# Patient Record
Sex: MALE | Race: WHITE | NOT HISPANIC OR LATINO | Employment: FULL TIME | URBAN - NONMETROPOLITAN AREA
[De-identification: names, ages, dates, MRNs, and addresses within clinical notes are randomized per-mention and may not be internally consistent; named-entity substitution may affect disease eponyms.]

---

## 2017-01-16 ENCOUNTER — INFUSION (OUTPATIENT)
Dept: ONCOLOGY | Facility: CLINIC | Age: 42
End: 2017-01-16

## 2017-01-16 DIAGNOSIS — C62.11 MALIGNANT NEOPLASM OF DESCENDED RIGHT TESTIS (HCC): Primary | ICD-10-CM

## 2017-01-16 RX ORDER — SODIUM CHLORIDE 0.9 % (FLUSH) 0.9 %
10 SYRINGE (ML) INJECTION AS NEEDED
Status: CANCELLED | OUTPATIENT
Start: 2017-01-16

## 2017-01-16 RX ORDER — SODIUM CHLORIDE 0.9 % (FLUSH) 0.9 %
10 SYRINGE (ML) INJECTION AS NEEDED
Status: DISCONTINUED | OUTPATIENT
Start: 2017-01-16 | End: 2017-01-16 | Stop reason: HOSPADM

## 2017-01-16 RX ADMIN — Medication 10 ML: at 09:18

## 2017-02-02 DIAGNOSIS — C62.91 MALIGNANT NEOPLASM OF RIGHT TESTIS, UNSPECIFIED WHETHER DESCENDED OR UNDESCENDED (HCC): Primary | ICD-10-CM

## 2017-02-03 ENCOUNTER — LAB (OUTPATIENT)
Dept: ONCOLOGY | Facility: CLINIC | Age: 42
End: 2017-02-03

## 2017-02-03 ENCOUNTER — OFFICE VISIT (OUTPATIENT)
Dept: ONCOLOGY | Facility: CLINIC | Age: 42
End: 2017-02-03

## 2017-02-03 VITALS
DIASTOLIC BLOOD PRESSURE: 84 MMHG | TEMPERATURE: 97.7 F | OXYGEN SATURATION: 96 % | SYSTOLIC BLOOD PRESSURE: 120 MMHG | HEIGHT: 68 IN | WEIGHT: 222.6 LBS | RESPIRATION RATE: 16 BRPM | BODY MASS INDEX: 33.74 KG/M2 | HEART RATE: 72 BPM

## 2017-02-03 DIAGNOSIS — C62.91 MALIGNANT NEOPLASM OF RIGHT TESTIS, UNSPECIFIED WHETHER DESCENDED OR UNDESCENDED (HCC): ICD-10-CM

## 2017-02-03 DIAGNOSIS — C62.11 MALIGNANT NEOPLASM OF DESCENDED RIGHT TESTIS (HCC): Primary | ICD-10-CM

## 2017-02-03 DIAGNOSIS — C77.2 SECONDARY AND UNSPECIFIED MALIGNANT NEOPLASM OF INTRA-ABDOMINAL LYMPH NODES (HCC): ICD-10-CM

## 2017-02-03 PROBLEM — D48.9 TERATOMA: Status: ACTIVE | Noted: 2017-02-03

## 2017-02-03 PROCEDURE — 99213 OFFICE O/P EST LOW 20 MIN: CPT | Performed by: INTERNAL MEDICINE

## 2017-02-03 RX ORDER — CARISOPRODOL 350 MG/1
TABLET ORAL
COMMUNITY
Start: 2017-02-02 | End: 2020-12-07

## 2017-02-03 RX ORDER — PITAVASTATIN CALCIUM 4.18 MG/1
1 TABLET, FILM COATED ORAL DAILY
COMMUNITY
Start: 2017-01-24

## 2017-02-03 RX ORDER — AZELASTINE HYDROCHLORIDE AND FLUTICASONE PROPIONATE 137; 50 UG/1; UG/1
SPRAY, METERED NASAL
COMMUNITY
Start: 2017-01-24 | End: 2020-07-07

## 2017-02-03 NOTE — PROGRESS NOTES
[facility]  HEMATOLOGY & ONCOLOGY        Subjective  is doing very well and is asymptomatic.  His markers including LDH beta-hCG and alpha-fetoprotein were all normal in October.  His scans last year were also normal but we will repeat those this year.  He keeps track of his condition particularly since he was so unusual and having an abdominal teratoma form, while chemotherapy was being administered.  He is a pharmaceutical salesman and doing well.  I have seen him in the past also.    VISIT DIAGNOSIS: Testicular carcinoma                                       Abdominal teratoma-all in remission    Encounter Diagnosis   Name Primary?   • Malignant neoplasm of descended right testis Yes       REASON FOR VISIT:   No chief complaint on file.       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 Tumor was a maximum of 3.7 cm in  diameter, no lymphovascular invasion is noted. The margins were clear and spermatic cord was unrevealing. The patient has undergonea  CT scan of the chest which is normal. A CT abdomen and pelvis which is reported to show a 3 cm left hepatic dome compatible with a  hemangioma, but ultrasound correlation recommended. . Preoperative laboratory studies revealed a normal CBC, a chemistry profilewithin  normal range. I do not see a LDH performed. His beta HCG was elevated at 8 with a normal being 03. His alpha fetoprotein [id number] with  normal being 0 to 8.Patient underwent a retroperitoneal lymph node dissection on August 16, 2011. Results of this surgery showed  negative nodes. Stage I  disease. On 10/10/2011 his alphafetoproteinwas  3.6 and beta hCG was undetectable. His LDH was normal.On 12/16/2011 his alphafetoproteinwas  3.5 and a beta  hCG was 194. His beta hCG was repeated in our office was 220. Outpatient CAT  scans were performed on 12/30/2011. The chest has been unremarkable. CT scan of the abdomen and pelvis revealed small lymphnodes  in the retroperitoneum and a 2.5 cm mass effect. Question of seroma versus lymph node involvement was raised. In this setting arising  markers within 4 months of his retroperitoneal lymph node dissection and the fact that these markers were negative and 2 monthsafter his  dissection is consistent with recurrence of his disease and the need for chemotherapy treatment.  The patient received 3 cycles of BEP therapy from 1/16/2012 to 3/12/2012. A repeat CAT scan of his abdomen and pelvis on  3/19/2014post his 3 cycles of chemotherapy showed a mass lesion in his retroperitoneum. He return to [facility]  and underwent a retroperitoneal dissection of this mass by Dr. Paniagua, removing a benign teratoma on May 3, 2012. He is  undergoingsurveillance for recurrence at this time.        Malignant neoplasm of right testis    10/18/2016 Initial Diagnosis    Malignant neoplasm of right testis       Cancer Staging Information:  No matching staging information was found for the patient.      INTERVAL HISTORY  Patient ID: Alex Vann is a 41 y.o. year old male is here today for follow-up.      Past Medical History:   Past Medical History   Diagnosis Date   • Hyperlipidemia    • Malignant neoplasm of right testis 10/18/2016   • Osteoarthritis    • Testicular mass      Past Surgical History:   Past Surgical History   Procedure Laterality Date   • Vasectomy     • Testicle surgery       Granuloma of the right testicle   • Knee arthroscopy       Social History:   Social History     Social History   • Marital status:      Spouse name: N/A   • Number of children: N/A   • Years of education: N/A     Occupational History   • Not on file.     Social History Main Topics   • Smoking status: Former  "Smoker   • Smokeless tobacco: Not on file   • Alcohol use Yes   • Drug use: Not on file   • Sexual activity: Not on file     Other Topics Concern   • Not on file     Social History Narrative     Family History:   Family History   Problem Relation Age of Onset   • Mental illness Paternal Uncle        Review of Systems   Allergic/Immunologic: Positive for environmental allergies.   All other systems reviewed and are negative.       Performance Status:  Asymptomatic    Medications:    Current Outpatient Prescriptions   Medication Sig Dispense Refill   • carisoprodol (SOMA) 350 MG tablet      • DYMISTA 137-50 MCG/ACT suspension      • ezetimibe (ZETIA) 10 MG tablet Take 10 mg by mouth Daily.     • levocetirizine (XYZAL) 5 MG tablet Take 5 mg by mouth.     • LIVALO 4 MG tablet      • meloxicam (MOBIC) 15 MG tablet Take 15 mg by mouth Daily.     • montelukast (SINGULAIR) 4 MG chewable tablet Chew 4 mg.     • pitavastatin calcium (LIVALO) 2 MG tablet tablet Take 2 mg by mouth.     • zolpidem CR (AMBIEN CR) 12.5 MG CR tablet Take 12.5 mg by mouth.       No current facility-administered medications for this visit.        ALLERGIES:    Allergies   Allergen Reactions   • Sulfa Antibiotics        Objective      Vitals:    02/03/17 0821   BP: 120/84   Pulse: 72   Resp: 16   Temp: 97.7 °F (36.5 °C)   TempSrc: Tympanic   SpO2: 96%   Weight: 222 lb 9.6 oz (101 kg)   Height: 68\" (172.7 cm)     Visit Vitals   • /84   • Pulse 72   • Temp 97.7 °F (36.5 °C) (Tympanic)   • Resp 16   • Ht 68\" (172.7 cm)   • Wt 222 lb 9.6 oz (101 kg)   • SpO2 96%   • BMI 33.85 kg/m2       Current Status 2/3/2017   ECOG score 0         General Appearance:    Alert, cooperative, no distress, appears stated age   Head:    Normocephalic, without obvious abnormality, atraumatic   Eyes:    PERRL, conjunctiva/corneas clear, EOM's intact, fundi     benign, both eyes   Ears:    Normal TM's and external ear canals, both ears   Nose:   Nares normal, septum " midline, mucosa normal, no drainage     or sinus tenderness   Throat:   Lips, mucosa, and tongue normal; teeth and gums normal   Neck:   Supple, symmetrical, trachea midline, no adenopathy;     thyroid:  no enlargement/tenderness/nodules; no carotid    bruit or JVD   Back:     Symmetric, no curvature, ROM normal, no CVA tenderness   Lungs:     Clear to auscultation bilaterally, respirations unlabored   Chest Wall:    No tenderness or deformity    Heart:    Regular rate and rhythm, S1 and S2 normal, no murmur, rub    or gallop   Abdomen:     Soft, non-tender, bowel sounds active all four quadrants,     no masses, no organomegaly; midline scars is well healed    Extremities:   Extremities normal, atraumatic, no cyanosis or edema   Pulses:   2+ and symmetric all extremities   Skin:   Skin color, texture, turgor normal, no rashes or lesions   Lymph nodes:   Cervical, supraclavicular, and axillary nodes normal   Neurologic:   CNII-XII intact, normal strength, sensation and reflexes     throughout       RECENT LABS:    No results found for: AMPHETSCREEN, BARBITSCNUR, LABBENZSCN, COCAINEUR, OSMOLALITY, PCPUR, THCSCNINP, LABMETHSCN     Results for orders placed or performed in visit on 10/18/16   Comprehensive Metabolic Panel   Result Value Ref Range    Glucose 93 75 - 110 mg/dL    BUN 17 9 - 21 mg/dL    Creatinine 1.10 0.80 - 1.50 mg/dL    Sodium 143 137 - 145 mmol/L    Potassium 4.4 3.6 - 5.0 mmol/L    Chloride 106 98 - 107 mmol/L    CO2 26.0 22.0 - 30.0 mmol/L    Calcium 9.9 8.4 - 10.2 mg/dL    Total Protein 7.8 6.3 - 8.2 g/dL    Albumin 4.60 3.50 - 5.00 g/dL    ALT (SGPT) 47 21 - 72 U/L    AST (SGOT) 28 5 - 40 U/L    Alkaline Phosphatase 65 38 - 126 U/L    Total Bilirubin 0.6 0.2 - 1.3 mg/dL    eGFR Non African Amer 73 >60 mL/min/1.73    Globulin 3.2 gm/dL    A/G Ratio 1.4 g/dL    BUN/Creatinine Ratio 15.5 7.0 - 25.0    Anion Gap 11.0 mmol/L   Lactate Dehydrogenase   Result Value Ref Range     313 - 618 U/L   CBC  Auto Differential   Result Value Ref Range    WBC 4.70 (L) 4.80 - 10.80 10*3/mm3    RBC 5.12 4.70 - 6.10 10*6/mm3    Hemoglobin 15.5 14.0 - 18.0 g/dL    Hematocrit 48.6 42.0 - 52.0 %    MCV 95.0 (H) 80.0 - 94.0 fL    MCH 30.3 27.0 - 31.0 pg    MCHC 31.9 (L) 33.0 - 37.0 g/dL    RDW 13.9 11.5 - 14.5 %    MPV 8.8 6.0 - 12.0 fL    Platelets 217 130 - 400 10*3/mm3    Neutrophil % 59.7 37.0 - 92.0 %    Lymphocyte % 30.9 10.0 - 58.5 %    Auto Mixed Cells % 9.4 0.2 - 15.1 %    Neutrophils, Absolute 2.80 2.00 - 7.80 10*3/mm3    Lymphocytes, Absolute 1.50 0.80 - 7.00 10*3/mm3    Auto Mixed Cells # 0.40 0.10 - 1.50 10*3/uL           RADIOLOGY:  No results found.      Assessment/Plan     Patient Active Problem List   Diagnosis   • Malignant neoplasm of right testis        Diagnoses and all orders for this visit:    Malignant neoplasm of descended right testis       In remission and doing well-might benefit from vitamin B6,25 mg po daily for a few months as an empiric effort to Rx the mild neuropathy he demonstrates.          You Lora MD    2/3/2017    9:22 AM

## 2017-03-13 ENCOUNTER — INFUSION (OUTPATIENT)
Dept: ONCOLOGY | Facility: CLINIC | Age: 42
End: 2017-03-13

## 2017-03-13 DIAGNOSIS — C62.11 MALIGNANT NEOPLASM OF DESCENDED RIGHT TESTIS (HCC): Primary | ICD-10-CM

## 2017-03-13 RX ORDER — SODIUM CHLORIDE 0.9 % (FLUSH) 0.9 %
10 SYRINGE (ML) INJECTION AS NEEDED
Status: DISCONTINUED | OUTPATIENT
Start: 2017-03-13 | End: 2017-03-13 | Stop reason: HOSPADM

## 2017-03-13 RX ORDER — SODIUM CHLORIDE 0.9 % (FLUSH) 0.9 %
10 SYRINGE (ML) INJECTION AS NEEDED
Status: CANCELLED | OUTPATIENT
Start: 2017-03-13

## 2017-03-13 RX ADMIN — Medication 10 ML: at 08:24

## 2017-04-18 ENCOUNTER — LAB (OUTPATIENT)
Dept: ONCOLOGY | Facility: CLINIC | Age: 42
End: 2017-04-18

## 2017-04-18 ENCOUNTER — HOSPITAL ENCOUNTER (OUTPATIENT)
Dept: CT IMAGING | Facility: HOSPITAL | Age: 42
Discharge: HOME | End: 2017-04-18
Attending: INTERNAL MEDICINE | Admitting: INTERNAL MEDICINE

## 2017-04-18 ENCOUNTER — HOSPITAL ENCOUNTER (OUTPATIENT)
Dept: GENERAL RADIOLOGY | Facility: HOSPITAL | Age: 42
Discharge: HOME | End: 2017-04-18
Attending: INTERNAL MEDICINE

## 2017-04-18 DIAGNOSIS — C62.11 MALIGNANT NEOPLASM OF DESCENDED RIGHT TESTIS (HCC): ICD-10-CM

## 2017-04-18 DIAGNOSIS — C77.2 SECONDARY AND UNSPECIFIED MALIGNANT NEOPLASM OF INTRA-ABDOMINAL LYMPH NODES (HCC): ICD-10-CM

## 2017-04-18 DIAGNOSIS — C62.91 MALIGNANT NEOPLASM OF RIGHT TESTIS, UNSPECIFIED WHETHER DESCENDED OR UNDESCENDED (HCC): Primary | ICD-10-CM

## 2017-04-18 DIAGNOSIS — C62.91 MALIGNANT NEOPLASM OF RIGHT TESTIS, UNSPECIFIED WHETHER DESCENDED OR UNDESCENDED (HCC): ICD-10-CM

## 2017-04-18 LAB
ALBUMIN SERPL-MCNC: 4.7 G/DL (ref 3.5–5)
ALBUMIN/GLOB SERPL: 1.7 G/DL
ALP SERPL-CCNC: 50 U/L (ref 38–126)
ALT SERPL W P-5'-P-CCNC: 52 U/L (ref 21–72)
ANION GAP SERPL CALCULATED.3IONS-SCNC: 9 MMOL/L
AST SERPL-CCNC: 34 U/L (ref 5–40)
AUTO MIXED CELLS #: 0.5 10*3/UL (ref 0.1–1.5)
AUTO MIXED CELLS %: 11.2 % (ref 0.2–15.1)
BILIRUB SERPL-MCNC: 0.7 MG/DL (ref 0.2–1.3)
BUN BLD-MCNC: 13 MG/DL (ref 9–21)
BUN/CREAT SERPL: 14.4 (ref 7–25)
CALCIUM SPEC-SCNC: 9.7 MG/DL (ref 8.4–10.2)
CHLORIDE SERPL-SCNC: 105 MMOL/L (ref 98–107)
CO2 SERPL-SCNC: 27 MMOL/L (ref 22–30)
CREAT BLD-MCNC: 0.9 MG/DL (ref 0.8–1.5)
CREAT BLDA-MCNC: 1 MG/DL (ref 0.6–1.3)
ERYTHROCYTE [DISTWIDTH] IN BLOOD BY AUTOMATED COUNT: 13.4 % (ref 11.5–14.5)
GFR SERPL CREATININE-BSD FRML MDRD: 93 ML/MIN/1.73
GLOBULIN UR ELPH-MCNC: 2.8 GM/DL
GLUCOSE BLD-MCNC: 87 MG/DL (ref 75–110)
HCT VFR BLD AUTO: 47.5 % (ref 42–52)
HGB BLD-MCNC: 15.6 G/DL (ref 14–18)
LDH SERPL-CCNC: 385 U/L (ref 313–618)
LYMPHOCYTES # BLD AUTO: 1.3 10*3/MM3 (ref 0.8–7)
LYMPHOCYTES NFR BLD AUTO: 32 % (ref 10–58.5)
MCH RBC QN AUTO: 31.2 PG (ref 27–31)
MCHC RBC AUTO-ENTMCNC: 32.8 G/DL (ref 33–37)
MCV RBC AUTO: 95 FL (ref 80–94)
NEUTROPHILS # BLD AUTO: 2.4 10*3/MM3 (ref 2–7.8)
NEUTROPHILS NFR BLD AUTO: 56.8 % (ref 37–92)
PLATELET # BLD AUTO: 244 10*3/MM3 (ref 130–400)
PMV BLD AUTO: 8.6 FL (ref 6–12)
POTASSIUM BLD-SCNC: 4.1 MMOL/L (ref 3.6–5)
PROT SERPL-MCNC: 7.5 G/DL (ref 6.3–8.2)
RBC # BLD AUTO: 5 10*6/MM3 (ref 4.7–6.1)
SODIUM BLD-SCNC: 141 MMOL/L (ref 137–145)
WBC NRBC COR # BLD: 4.2 10*3/MM3 (ref 4.8–10.8)

## 2017-04-18 PROCEDURE — 82565 ASSAY OF CREATININE: CPT

## 2017-04-18 PROCEDURE — 36415 COLL VENOUS BLD VENIPUNCTURE: CPT | Performed by: INTERNAL MEDICINE

## 2017-04-18 PROCEDURE — 83615 LACTATE (LD) (LDH) ENZYME: CPT | Performed by: INTERNAL MEDICINE

## 2017-04-18 PROCEDURE — 71260 CT THORAX DX C+: CPT

## 2017-04-18 PROCEDURE — 74177 CT ABD & PELVIS W/CONTRAST: CPT

## 2017-04-18 PROCEDURE — 85025 COMPLETE CBC W/AUTO DIFF WBC: CPT | Performed by: INTERNAL MEDICINE

## 2017-04-18 PROCEDURE — 80053 COMPREHEN METABOLIC PANEL: CPT | Performed by: INTERNAL MEDICINE

## 2017-04-18 PROCEDURE — 0 IOPAMIDOL PER 1 ML: Performed by: INTERNAL MEDICINE

## 2017-04-18 PROCEDURE — 71020 HC CHEST PA AND LATERAL: CPT

## 2017-04-18 RX ADMIN — IOPAMIDOL 150 ML: 755 INJECTION, SOLUTION INTRAVENOUS at 09:30

## 2017-04-19 LAB
AFP-TM SERPL-MCNC: 2.6 NG/ML (ref 0–8.3)
HCG INTACT+B SERPL-ACNC: <2.39 MIU/ML (ref 0–10)

## 2017-04-21 ENCOUNTER — OFFICE VISIT (OUTPATIENT)
Dept: ONCOLOGY | Facility: CLINIC | Age: 42
End: 2017-04-21

## 2017-04-21 VITALS
DIASTOLIC BLOOD PRESSURE: 84 MMHG | HEART RATE: 72 BPM | RESPIRATION RATE: 16 BRPM | TEMPERATURE: 97.9 F | OXYGEN SATURATION: 97 % | SYSTOLIC BLOOD PRESSURE: 138 MMHG | HEIGHT: 68 IN | WEIGHT: 220.8 LBS | BODY MASS INDEX: 33.46 KG/M2

## 2017-04-21 DIAGNOSIS — C62.11 MALIGNANT NEOPLASM OF DESCENDED RIGHT TESTIS (HCC): Primary | ICD-10-CM

## 2017-04-21 PROCEDURE — 99214 OFFICE O/P EST MOD 30 MIN: CPT | Performed by: INTERNAL MEDICINE

## 2017-04-21 NOTE — PROGRESS NOTES
[facility]  HEMATOLOGY & ONCOLOGY        Subjective     VISIT DIAGNOSIS: No diagnosis found.    REASON FOR VISIT:   No chief complaint on file.       HEMATOLOGY / ONCOLOGY HISTORY:   Oncology/Hematology History    Mr. Vann is a 39 year old male who initially sought urologic attention because of a vasectomy. The patient underwent surgery  on7/22/2008 without complications. He was seen again because of a scrotal mass on 8/14/2008. He was examined by Dr. Guadalupe at  thattime and was felt to have a sperm granuloma status post vasectomy and this was not felt to be of any consequence. This was a mass  1cm nodule above his right testicle.The patient returned to see Dr. Mack June 21, 2011, with the finding of a right scrotal mass sudden in  onset. The mass was identified approximately two weeks prior to visit. It had been increasing in size with no associated pain. The mass  was identified during a testicularself exam. Dr. Mack examined the right testicle revealing a mildly tender, firm, marble sized mass lower  aspect of the testicle.  Ultrasound examination was performed finding a 2.8 cm heterogeneous mass in the right testicle. There were some internal cysticchanges  consistent with necrosis. This was worrisome for testicular carcinoma. There is diffuse microlithiasis involving the right testes.The left  testes shows diffuse microlithiasis but less severe on the right. No lesion was appreciated. There was an epidermal cystinvolving the right  testes measuring 5 mm and has a benign appearance and a varicocele on the left testes noted as well.  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 Tumor was a maximum of 3.7 cm in  diameter, no lymphovascular invasion is noted. The margins were clear and spermatic cord was  unrevealing. The patient has undergonea  CT scan of the chest which is normal. A CT abdomen and pelvis which is reported to show a 3 cm left hepatic dome compatible with a  hemangioma, but ultrasound correlation recommended. . Preoperative laboratory studies revealed a normal CBC, a chemistry profilewithin  normal range. I do not see a LDH performed. His beta HCG was elevated at 8 with a normal being 03. His alpha fetoprotein [id number] with  normal being 0 to 8.Patient underwent a retroperitoneal lymph node dissection on August 16, 2011. Results of this surgery showed  negative nodes. Stage I  disease. On 10/10/2011 his alphafetoproteinwas  3.6 and beta hCG was undetectable. His LDH was normal.On 12/16/2011 his alphafetoproteinwas  3.5 and a beta hCG was 194. His beta hCG was repeated in our office was 220. Outpatient CAT  scans were performed on 12/30/2011. The chest has been unremarkable. CT scan of the abdomen and pelvis revealed small lymphnodes  in the retroperitoneum and a 2.5 cm mass effect. Question of seroma versus lymph node involvement was raised. In this setting arising  markers within 4 months of his retroperitoneal lymph node dissection and the fact that these markers were negative and 2 monthsafter his  dissection is consistent with recurrence of his disease and the need for chemotherapy treatment.  The patient received 3 cycles of BEP therapy from 1/16/2012 to 3/12/2012. A repeat CAT scan of his abdomen and pelvis on  3/19/2014post his 3 cycles of chemotherapy showed a mass lesion in his retroperitoneum. He return to [facility]  and underwent a retroperitoneal dissection of this mass by Dr. Paniagua, removing a benign teratoma on May 3, 2012. He is  undergoingsurveillance for recurrence at this time.          Malignant neoplasm of right testis    10/18/2016 Initial Diagnosis    Malignant neoplasm of right testis     [No treatment plan]  Cancer Staging Information:  No matching  staging information was found for the patient.      INTERVAL HISTORY  Patient ID: Alex Vann is a 41 y.o. year old male         Review of Systems         Medications:    Current Outpatient Prescriptions   Medication Sig Dispense Refill   • carisoprodol (SOMA) 350 MG tablet      • DYMISTA 137-50 MCG/ACT suspension      • ezetimibe (ZETIA) 10 MG tablet Take 10 mg by mouth Daily.     • levocetirizine (XYZAL) 5 MG tablet Take 5 mg by mouth.     • LIVALO 4 MG tablet      • meloxicam (MOBIC) 15 MG tablet Take 15 mg by mouth Daily.     • montelukast (SINGULAIR) 4 MG chewable tablet Chew 4 mg.     • zolpidem CR (AMBIEN CR) 12.5 MG CR tablet Take 12.5 mg by mouth.       No current facility-administered medications for this visit.        ALLERGIES:    Allergies   Allergen Reactions   • Sulfa Antibiotics        Objective      @VITALS    Current Status 2/3/2017   ECOG score 0       General Appearance: Patient is awake, alert, oriented and in no acute distress. Patient is welldeveloped, wellnourished, and appears stated age.  HEENT: Normocephalic. Sclerae clear, conjunctiva pink, extraocular movements intact, pupils, round, reactive to light and  accommodation. Mouth and throat are clear with moist oral mucosa.  NECK: Supple, no jugular venous distention, thyroid not enlarged.  LYMPH: No cervical, supraclavicular, axillary, or inguinal lymphadenopathy.  CHEST: Equal bilateral expansion, AP  diameter normal, resonant percussion note  LUNGS: Good air movement, no rales, rhonchi, rubs or wheezes with auscultation  CARDIO: Regular sinus rhythm, no murmurs, gallops or rubs.  ABDOMEN: Nondistended, soft, No tenderness, no guarding, no rebound, No hepatosplenomegaly. No abdominal masses. Bowel sounds positive. No hernia  GENITALIA: Not examined.  BREASTS: Not examined.  MUSKEL: No joint swelling, decreased motion, or inflammation  EXTREMS: No edema, clubbing, cyanosis, No varicose veins.  NEURO: Grossly nonfocal, Gait is  coordinated and smooth, Cognition is preserved.  SKIN: No rashes, no ecchymoses, no petechia.  PSYCH: Oriented to time, place and person. Memory is preserved. Mood and affect appear normal      RECENT LABS:  Lab on 04/18/2017   Component Date Value Ref Range Status   • HCG Quantitative 04/18/2017 <2.39  0.00 - 10.00 mIU/mL Final   • AFP Tumor Marker 04/18/2017 2.6  0.0 - 8.3 ng/mL Final    Roche ECLIA methodology   Orders Only on 04/18/2017   Component Date Value Ref Range Status   • Glucose 04/18/2017 87  75 - 110 mg/dL Final   • BUN 04/18/2017 13  9 - 21 mg/dL Final   • Creatinine 04/18/2017 0.90  0.80 - 1.50 mg/dL Final   • Sodium 04/18/2017 141  137 - 145 mmol/L Final   • Potassium 04/18/2017 4.1  3.6 - 5.0 mmol/L Final   • Chloride 04/18/2017 105  98 - 107 mmol/L Final   • CO2 04/18/2017 27.0  22.0 - 30.0 mmol/L Final   • Calcium 04/18/2017 9.7  8.4 - 10.2 mg/dL Final   • Total Protein 04/18/2017 7.5  6.3 - 8.2 g/dL Final   • Albumin 04/18/2017 4.70  3.50 - 5.00 g/dL Final   • ALT (SGPT) 04/18/2017 52  21 - 72 U/L Final   • AST (SGOT) 04/18/2017 34  5 - 40 U/L Final   • Alkaline Phosphatase 04/18/2017 50  38 - 126 U/L Final   • Total Bilirubin 04/18/2017 0.7  0.2 - 1.3 mg/dL Final   • eGFR Non African Amer 04/18/2017 93  >60 mL/min/1.73 Final   • Globulin 04/18/2017 2.8  gm/dL Final   • A/G Ratio 04/18/2017 1.7  g/dL Final   • BUN/Creatinine Ratio 04/18/2017 14.4  7.0 - 25.0 Final   • Anion Gap 04/18/2017 9.0  mmol/L Final   • LDH 04/18/2017 385  313 - 618 U/L Final   • WBC 04/18/2017 4.20* 4.80 - 10.80 10*3/mm3 Final   • RBC 04/18/2017 5.00  4.70 - 6.10 10*6/mm3 Final   • Hemoglobin 04/18/2017 15.6  14.0 - 18.0 g/dL Final   • Hematocrit 04/18/2017 47.5  42.0 - 52.0 % Final   • MCV 04/18/2017 95.0* 80.0 - 94.0 fL Final   • MCH 04/18/2017 31.2* 27.0 - 31.0 pg Final   • MCHC 04/18/2017 32.8* 33.0 - 37.0 g/dL Final   • RDW 04/18/2017 13.4  11.5 - 14.5 % Final   • MPV 04/18/2017 8.6  6.0 - 12.0 fL Final   •  Platelets 04/18/2017 244  130 - 400 10*3/mm3 Final   • Neutrophil % 04/18/2017 56.8  37.0 - 92.0 % Final   • Lymphocyte % 04/18/2017 32.0  10.0 - 58.5 % Final   • Auto Mixed Cells % 04/18/2017 11.2  0.2 - 15.1 % Final   • Neutrophils, Absolute 04/18/2017 2.40  2.00 - 7.80 10*3/mm3 Final   • Lymphocytes, Absolute 04/18/2017 1.30  0.80 - 7.00 10*3/mm3 Final   • Auto Mixed Cells # 04/18/2017 0.50  0.10 - 1.50 10*3/uL Final   Hospital Outpatient Visit on 04/18/2017   Component Date Value Ref Range Status   • Creatinine 04/18/2017 1.00  0.60 - 1.30 mg/dL Final    Serial Number: [id number]    : [id number]       RADIOLOGY:  Ct Chest W Contrast    Result Date: 4/18/2017  Narrative: CT CHEST with contrast 4/18/2017 8:46 AM CDT  HISTORY: Testicular cancer  COMPARISON: None  DLP: 620 mGy cm  TECHNIQUE: Serial helical tomographic images of the chest were acquired following the infusion of Isovue contrast intravenously. Bone and soft tissue algorithms were provided.   FINDINGS: Neck base: The imaged portion of the neck and thyroid gland is unremarkable.  Lungs: The lungs are clear without pneumothorax, consolidation, nodules or mass lesion. No pleural effusion is present. The trachea and bronchial tree are patent.  Heart: The heart is normal in size. There is no pericardial effusion.  Great vessels: The great vessels are normal in caliber and are patent. The pulmonary arteries are patent within limits of this study and are normal in caliber.  Lymph nodes: No significant hilar, mediastinal or axillary lymphadenopathy is appreciated.  Skeletal and soft tissues: The osseous structures of the thorax and surrounding soft tissues are unremarkable.  Upper abdomen: The imaged portion of the upper abdomen demonstrates no acute process.         Impression: 1. There is no evidence of metastatic disease. This is a negative CT of the chest.   This report was finalized on 04/18/2017 09:55 by Dr. Carmelo Herron MD.    Ct Abdomen Pelvis W  Contrast    Result Date: 4/18/2017  Narrative: EXAMINATION:  CT ABDOMEN PELVIS W CONTRAST-  4/18/2017 9:46 AM EDT  HISTORY: Metastatic testicular cancer.  TECHNIQUE: Spiral CT was performed of the abdomen and pelvis with contrast. Multiplanar images were reconstructed.  DLP: 780 mGy-cm. Automated dosage control was utilized.  COMPARISON: 04/28/2016.  LUNG BASES: The lung bases are clear.  LIVER AND SPLEEN: There is fatty infiltration of the liver. No obvious gallstones. The spleen is unremarkable.  PANCREAS: No pancreatic mass or inflammatory change.  KIDNEYS AND ADRENALS: The kidneys and adrenal glands demonstrate no significant abnormality. There is minimal wall thickening of the urinary bladder. The spermatic cord appears to be absent within the right inguinal canal.  BOWEL: Bowel loops are nondilated. There is a small area of intussusception in the mid abdomen. I do not see any mass effect in this region. This is likely just a transient intussusception. Bowel loops are nondilated.  OTHER: There is a fat-containing hernia at the umbilicus. There are several hernias arising from the anterior abdominal wall above the umbilicus. These all appear relatively stable. There are bilateral fat-containing inguinal hernias. There is mild atheromatous disease of the aortoiliac vessels. There is no lymphadenopathy or ascites. There are degenerative changes of the lumbar spine. There are bilateral pars defects at L5 with spondylolisthesis. There is severe degenerative disc disease at L5-S1. There is at least bilateral foraminal stenosis at this level.      Impression: 1. No evidence of metastatic disease. 2. Fatty liver. 3. Mild wall thickening of the urinary bladder versus artifact of poor distention. 4. Probable transient area of small bowel intussusception in the mid abdomen. There is no mass identified in this area and there is no bowel distention. 5. Fat-containing hernia at the umbilicus with several hernias arising from  the anterior abdominal wall above the umbilicus. There are fat-containing inguinal hernias bilaterally. 6. Degenerative changes of the spine with bilateral pars defects and spondylolisthesis at L5. This report was finalized on 04/18/2017 13:20 by Dr. Mauricio Cisneros MD.    Xr Chest Pa & Lateral    Result Date: 4/18/2017  Narrative: TWO-VIEW CHEST:  HISTORY: Testicular cancer with metastatic lymphadenopathy.  Frontal and lateral projection chest radiograph obtained.  COMPARISON:  04/12/2016, 08/06/2015 and 03/27/2015.  FINDINGS: Left-sided infusion catheter identified.  The lungs are clear without parenchymal infiltrates.  The heart is normal in size.  The pulmonary circulation is appropriate without heart failure.  The bony structures are intact.  Radiographically, the chest is unchanged.                                                                                                                  Impression: 1. No acute cardiopulmonary process.   This report was finalized on 04/18/2017 09:32 by Dr. Chris De Paz MD.           Assessment/Plan      Most recent CT scans of the chest abdomen and pelvis done on 418 2017 all reported negative for any recurrent disease.  Also at the same time AFP and beta-hCG as well as LDH are reported negative.  Clinically patient is doing well.  Return to clinic for repeat tumor marker studies in the next few months.            Nikko Trinh MD    4/21/2017    9:47 AM

## 2017-05-12 ENCOUNTER — INFUSION (OUTPATIENT)
Dept: ONCOLOGY | Facility: CLINIC | Age: 42
End: 2017-05-12

## 2017-05-12 DIAGNOSIS — C62.11 MALIGNANT NEOPLASM OF DESCENDED RIGHT TESTIS (HCC): Primary | ICD-10-CM

## 2017-05-12 RX ORDER — SODIUM CHLORIDE 0.9 % (FLUSH) 0.9 %
10 SYRINGE (ML) INJECTION AS NEEDED
Status: DISCONTINUED | OUTPATIENT
Start: 2017-05-12 | End: 2017-05-12 | Stop reason: HOSPADM

## 2017-05-12 RX ORDER — SODIUM CHLORIDE 0.9 % (FLUSH) 0.9 %
10 SYRINGE (ML) INJECTION AS NEEDED
Status: CANCELLED | OUTPATIENT
Start: 2017-05-12

## 2017-05-12 RX ADMIN — Medication 10 ML: at 08:53

## 2017-07-12 ENCOUNTER — INFUSION (OUTPATIENT)
Dept: ONCOLOGY | Facility: CLINIC | Age: 42
End: 2017-07-12

## 2017-07-12 DIAGNOSIS — C62.11 MALIGNANT NEOPLASM OF DESCENDED RIGHT TESTIS (HCC): Primary | ICD-10-CM

## 2017-07-12 RX ORDER — SODIUM CHLORIDE 0.9 % (FLUSH) 0.9 %
10 SYRINGE (ML) INJECTION AS NEEDED
Status: CANCELLED | OUTPATIENT
Start: 2017-07-12

## 2017-07-12 RX ORDER — SODIUM CHLORIDE 0.9 % (FLUSH) 0.9 %
10 SYRINGE (ML) INJECTION AS NEEDED
Status: DISCONTINUED | OUTPATIENT
Start: 2017-07-12 | End: 2017-07-12 | Stop reason: HOSPADM

## 2017-07-12 RX ADMIN — Medication 10 ML: at 08:48

## 2017-09-06 ENCOUNTER — INFUSION (OUTPATIENT)
Dept: ONCOLOGY | Facility: CLINIC | Age: 42
End: 2017-09-06

## 2017-09-06 DIAGNOSIS — C62.11 MALIGNANT NEOPLASM OF DESCENDED RIGHT TESTIS (HCC): Primary | ICD-10-CM

## 2017-09-06 RX ORDER — SODIUM CHLORIDE 0.9 % (FLUSH) 0.9 %
10 SYRINGE (ML) INJECTION AS NEEDED
Status: DISCONTINUED | OUTPATIENT
Start: 2017-09-06 | End: 2017-09-06 | Stop reason: HOSPADM

## 2017-09-06 RX ORDER — SODIUM CHLORIDE 0.9 % (FLUSH) 0.9 %
10 SYRINGE (ML) INJECTION AS NEEDED
Status: CANCELLED | OUTPATIENT
Start: 2017-09-06

## 2017-09-06 RX ORDER — HEPARIN SODIUM (PORCINE) LOCK FLUSH IV SOLN 100 UNIT/ML 100 UNIT/ML
500 SOLUTION INTRAVENOUS AS NEEDED
Status: CANCELLED | OUTPATIENT
Start: 2017-09-06

## 2017-09-06 RX ADMIN — Medication 10 ML: at 08:50

## 2017-10-20 ENCOUNTER — LAB (OUTPATIENT)
Dept: LAB | Facility: HOSPITAL | Age: 42
End: 2017-10-20

## 2017-10-20 DIAGNOSIS — C62.91 MALIGNANT NEOPLASM OF RIGHT TESTIS, UNSPECIFIED WHETHER DESCENDED OR UNDESCENDED (HCC): Primary | ICD-10-CM

## 2017-10-20 DIAGNOSIS — C62.91 MALIGNANT NEOPLASM OF RIGHT TESTIS, UNSPECIFIED WHETHER DESCENDED OR UNDESCENDED (HCC): ICD-10-CM

## 2017-10-20 LAB
ALBUMIN SERPL-MCNC: 4.2 G/DL (ref 3.5–5)
ALBUMIN/GLOB SERPL: 1.6 G/DL (ref 1.1–2.5)
ALP SERPL-CCNC: 47 U/L (ref 24–120)
ALT SERPL W P-5'-P-CCNC: 51 U/L (ref 0–54)
ANION GAP SERPL CALCULATED.3IONS-SCNC: 9 MMOL/L (ref 4–13)
AST SERPL-CCNC: 27 U/L (ref 7–45)
AUTO MIXED CELLS #: 0.5 10*3/MM3 (ref 0.1–2.6)
AUTO MIXED CELLS %: 13.7 % (ref 0.1–24)
BILIRUB SERPL-MCNC: 0.4 MG/DL (ref 0.1–1)
BUN BLD-MCNC: 18 MG/DL (ref 5–21)
BUN/CREAT SERPL: 21.2
CALCIUM SPEC-SCNC: 9.8 MG/DL (ref 8.4–10.4)
CHLORIDE SERPL-SCNC: 108 MMOL/L (ref 98–110)
CO2 SERPL-SCNC: 23 MMOL/L (ref 24–31)
CREAT BLD-MCNC: 0.85 MG/DL (ref 0.5–1.4)
ERYTHROCYTE [DISTWIDTH] IN BLOOD BY AUTOMATED COUNT: 12.9 % (ref 12–15)
GFR SERPL CREATININE-BSD FRML MDRD: 98 ML/MIN/1.73
GLOBULIN UR ELPH-MCNC: 2.7 GM/DL
GLUCOSE BLD-MCNC: 98 MG/DL (ref 70–100)
HCT VFR BLD AUTO: 43.2 % (ref 40–52)
HGB BLD-MCNC: 15.1 G/DL (ref 14–18)
LDH SERPL-CCNC: 346 U/L (ref 265–665)
LYMPHOCYTES # BLD AUTO: 1.1 10*3/MM3 (ref 0.8–7)
LYMPHOCYTES NFR BLD AUTO: 28.1 % (ref 15–45)
MCH RBC QN AUTO: 30.8 PG (ref 28–32)
MCHC RBC AUTO-ENTMCNC: 35 G/DL (ref 33–36)
MCV RBC AUTO: 88.2 FL (ref 82–95)
NEUTROPHILS # BLD AUTO: 2.4 10*3/MM3 (ref 1.5–8.3)
NEUTROPHILS NFR BLD AUTO: 58.2 % (ref 39–78)
PLATELET # BLD AUTO: 204 10*3/MM3 (ref 130–400)
PMV BLD AUTO: 8.8 FL (ref 6–12)
POTASSIUM BLD-SCNC: 4 MMOL/L (ref 3.5–5.3)
PROT SERPL-MCNC: 6.9 G/DL (ref 6.3–8.7)
RBC # BLD AUTO: 4.9 10*6/MM3 (ref 4.2–5.4)
SODIUM BLD-SCNC: 140 MMOL/L (ref 135–145)
WBC NRBC COR # BLD: 4 10*3/MM3 (ref 4.8–10.8)

## 2017-10-20 PROCEDURE — 83615 LACTATE (LD) (LDH) ENZYME: CPT

## 2017-10-20 PROCEDURE — 80053 COMPREHEN METABOLIC PANEL: CPT

## 2017-10-20 PROCEDURE — 85025 COMPLETE CBC W/AUTO DIFF WBC: CPT

## 2017-10-20 PROCEDURE — 84702 CHORIONIC GONADOTROPIN TEST: CPT

## 2017-10-20 PROCEDURE — 36415 COLL VENOUS BLD VENIPUNCTURE: CPT

## 2017-10-20 PROCEDURE — 82105 ALPHA-FETOPROTEIN SERUM: CPT

## 2017-10-21 LAB
AFP-TM SERPL-MCNC: 2.8 NG/ML (ref 0–8.3)
HCG INTACT+B SERPL-ACNC: <1 MIU/ML (ref 0–3)

## 2017-10-24 ENCOUNTER — OFFICE VISIT (OUTPATIENT)
Dept: ONCOLOGY | Facility: CLINIC | Age: 42
End: 2017-10-24

## 2017-10-24 VITALS
SYSTOLIC BLOOD PRESSURE: 138 MMHG | DIASTOLIC BLOOD PRESSURE: 86 MMHG | HEART RATE: 96 BPM | HEIGHT: 68 IN | WEIGHT: 230.4 LBS | BODY MASS INDEX: 34.92 KG/M2 | OXYGEN SATURATION: 98 % | TEMPERATURE: 97.7 F | RESPIRATION RATE: 16 BRPM

## 2017-10-24 DIAGNOSIS — C62.91 MALIGNANT NEOPLASM OF RIGHT TESTIS, UNSPECIFIED WHETHER DESCENDED OR UNDESCENDED (HCC): Primary | ICD-10-CM

## 2017-10-24 PROCEDURE — 99213 OFFICE O/P EST LOW 20 MIN: CPT | Performed by: INTERNAL MEDICINE

## 2017-10-24 RX ORDER — INFLUENZA A VIRUS A/SINGAPORE/GP1908/2015 IVR-180 (H1N1) ANTIGEN (MDCK CELL DERIVED, PROPIOLACTONE INACTIVATED), INFLUENZA A VIRUS A/NORTH CAROLINA/04/2016 (H3N2) HEMAGGLUTININ ANTIGEN (MDCK CELL DERIVED, PROPIOLACTONE INACTIVATED), INFLUENZA B VIRUS B/IOWA/06/2017 HEMAGGLUTININ ANTIGEN (MDCK CELL DERIVED, PROPIOLACTONE INACTIVATED), INFLUENZA B VIRUS B/SINGAPORE/INFTT-16-0610/2016 HEMAGGLUTININ ANTIGEN (MDCK CELL DERIVED, PROPIOLACTONE INACTIVATED) 15; 15; 15; 15 UG/.5ML; UG/.5ML; UG/.5ML; UG/.5ML
INJECTION, SUSPENSION INTRAMUSCULAR
Refills: 0 | COMMUNITY
Start: 2017-08-16 | End: 2019-04-26

## 2017-10-24 RX ORDER — ERGOCALCIFEROL 1.25 MG/1
50000 CAPSULE ORAL
COMMUNITY
Start: 2017-10-16

## 2017-10-24 NOTE — PROGRESS NOTES
[facility]  HEMATOLOGY & ONCOLOGY    Cancer Staging Information:  No matching staging information was found for the patient.      Subjective     VISIT DIAGNOSIS:   Encounter Diagnosis   Name Primary?   • Malignant neoplasm of right testis, unspecified whether descended or undescended Yes       REASON FOR VISIT:     Chief Complaint   Patient presents with   • NSGCT     f/u        HEMATOLOGY / ONCOLOGY HISTORY:   Oncology/Hematology History    Mr. Vann is a 39 year old male who initially sought urologic attention because of a vasectomy. The patient underwent surgery  on7/22/2008 without complications. He was seen again because of a scrotal mass on 8/14/2008. He was examined by Dr. Guadalupe at  thattime and was felt to have a sperm granuloma status post vasectomy and this was not felt to be of any consequence. This was a mass  1cm nodule above his right testicle.The patient returned to see Dr. Mack June 21, 2011, with the finding of a right scrotal mass sudden in  onset. The mass was identified approximately two weeks prior to visit. It had been increasing in size with no associated pain. The mass  was identified during a testicularself exam. Dr. Mack examined the right testicle revealing a mildly tender, firm, marble sized mass lower  aspect of the testicle.  Ultrasound examination was performed finding a 2.8 cm heterogeneous mass in the right testicle. There were some internal cysticchanges  consistent with necrosis. This was worrisome for testicular carcinoma. There is diffuse microlithiasis involving the right testes.The left  testes shows diffuse microlithiasis but less severe on the right. No lesion was appreciated. There was an epidermal cystinvolving the right  testes measuring 5 mm and has a benign appearance and a varicocele on the left testes noted as well.  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 Tumor was a maximum of 3.7 cm in  diameter, no lymphovascular invasion is noted. The margins were clear and spermatic cord was unrevealing. The patient has undergonea  CT scan of the chest which is normal. A CT abdomen and pelvis which is reported to show a 3 cm left hepatic dome compatible with a  hemangioma, but ultrasound correlation recommended. . Preoperative laboratory studies revealed a normal CBC, a chemistry profilewithin  normal range. I do not see a LDH performed. His beta HCG was elevated at 8 with a normal being 03. His alpha fetoprotein [id number] with  normal being 0 to 8.Patient underwent a retroperitoneal lymph node dissection on August 16, 2011. Results of this surgery showed  negative nodes. Stage I  disease. On 10/10/2011 his alphafetoproteinwas  3.6 and beta hCG was undetectable. His LDH was normal.On 12/16/2011 his alphafetoproteinwas  3.5 and a beta hCG was 194. His beta hCG was repeated in our office was 220. Outpatient CAT  scans were performed on 12/30/2011. The chest has been unremarkable. CT scan of the abdomen and pelvis revealed small lymphnodes  in the retroperitoneum and a 2.5 cm mass effect. Question of seroma versus lymph node involvement was raised. In this setting arising  markers within 4 months of his retroperitoneal lymph node dissection and the fact that these markers were negative and 2 monthsafter his  dissection is consistent with recurrence of his disease and the need for chemotherapy treatment.  The patient received 3 cycles of BEP therapy from 1/16/2012 to 3/12/2012. A repeat CAT scan of his abdomen and pelvis on  3/19/2014post his 3 cycles of chemotherapy showed a mass lesion in his retroperitoneum. He return to [facility]  and underwent a retroperitoneal dissection of this mass by Dr. Paniagua, removing a benign teratoma on May 3, 2012. He  is  undergoingsurveillance for recurrence at this time.          Malignant neoplasm of right testis    10/18/2016 Initial Diagnosis     Malignant neoplasm of right testis              INTERVAL HISTORY  Patient ID: Alex Vann is a 42 y.o. year old male NSGCT here for follow-up.  Denies any new abnormality.    Past Medical History:   Past Medical History:   Diagnosis Date   • Hyperlipidemia    • Malignant neoplasm of right testis 10/18/2016   • Osteoarthritis    • Testicular mass      Past Surgical History:   Past Surgical History:   Procedure Laterality Date   • KNEE ARTHROSCOPY     • TESTICLE SURGERY      Granuloma of the right testicle   • VASECTOMY       Social History:   Social History     Social History   • Marital status:      Spouse name: N/A   • Number of children: N/A   • Years of education: N/A     Occupational History   • Not on file.     Social History Main Topics   • Smoking status: Former Smoker   • Smokeless tobacco: Not on file   • Alcohol use Yes   • Drug use: Not on file   • Sexual activity: Not on file     Other Topics Concern   • Not on file     Social History Narrative     Family History:   Family History   Problem Relation Age of Onset   • Mental illness Paternal Uncle        Review of Systems   Constitutional: Negative.    HENT: Negative.    Eyes: Negative.    Respiratory: Negative.    Cardiovascular: Negative.    Gastrointestinal: Negative.    Endocrine: Negative.    Genitourinary: Negative.    Musculoskeletal: Negative.    Skin: Negative.    Allergic/Immunologic: Negative.    Neurological: Negative.    Hematological: Negative.    Psychiatric/Behavioral: Negative.         Performance Status:  Asymptomatic    Medications:    Current Outpatient Prescriptions   Medication Sig Dispense Refill   • carisoprodol (SOMA) 350 MG tablet      • DYMISTA 137-50 MCG/ACT suspension      • ezetimibe (ZETIA) 10 MG tablet Take 10 mg by mouth Daily.     • FLUCELVAX QUADRIVALENT 0.5 ML suspension  "prefilled syringe injection TO BE ADMINISTERED BY PHARMACIST FOR IMMUNIZATION  0   • levocetirizine (XYZAL) 5 MG tablet Take 5 mg by mouth.     • LIVALO 4 MG tablet      • meloxicam (MOBIC) 15 MG tablet Take 15 mg by mouth Daily.     • montelukast (SINGULAIR) 4 MG chewable tablet Chew 4 mg.     • vitamin D (ERGOCALCIFEROL) 80610 units capsule capsule      • zolpidem CR (AMBIEN CR) 12.5 MG CR tablet Take 12.5 mg by mouth.       No current facility-administered medications for this visit.        ALLERGIES:    Allergies   Allergen Reactions   • Sulfa Antibiotics        Objective      Vitals:    10/24/17 0856   BP: 138/86   Pulse: 96   Resp: 16   Temp: 97.7 °F (36.5 °C)   TempSrc: Tympanic   SpO2: 98%   Weight: 230 lb 6.4 oz (105 kg)   Height: 68\" (172.7 cm)         Current Status 10/24/2017   ECOG score 0         Physical Exam  General Appearance: Patient is awake, alert, oriented and in no acute distress. Patient is welldeveloped, wellnourished, and appears stated age.  HEENT: Normocephalic. Sclerae clear, conjunctiva pink, extraocular movements intact, pupils, round, reactive to light and  accommodation. Mouth and throat are clear with moist oral mucosa.  NECK: Supple, no jugular venous distention, thyroid not enlarged.  LYMPH: No cervical, supraclavicular, axillary, or inguinal lymphadenopathy.  CHEST: Equal bilateral expansion, AP  diameter normal, resonant percussion note  LUNGS: Good air movement, no rales, rhonchi, rubs or wheezes with auscultation  CARDIO: Regular sinus rhythm, no murmurs, gallops or rubs.  ABDOMEN: Nondistended, soft, No tenderness, no guarding, no rebound, No hepatosplenomegaly. No abdominal masses. Bowel sounds positive. No hernia  GENITALIA: Not examined.  BREASTS: Not examined.  MUSKEL: No joint swelling, decreased motion, or inflammation  EXTREMS: No edema, clubbing, cyanosis, No varicose veins.  NEURO: Grossly nonfocal, Gait is coordinated and smooth, Cognition is preserved.  SKIN: No " rashes, no ecchymoses, no petechia.  PSYCH: Oriented to time, place and person. Memory is preserved. Mood and affect appear normal  RECENT LABS:  Lab on 10/20/2017   Component Date Value Ref Range Status   • Glucose 10/20/2017 98  70 - 100 mg/dL Final   • BUN 10/20/2017 18  5 - 21 mg/dL Final   • Creatinine 10/20/2017 0.85  0.50 - 1.40 mg/dL Final   • Sodium 10/20/2017 140  135 - 145 mmol/L Final   • Potassium 10/20/2017 4.0  3.5 - 5.3 mmol/L Final   • Chloride 10/20/2017 108  98 - 110 mmol/L Final   • CO2 10/20/2017 23.0* 24.0 - 31.0 mmol/L Final   • Calcium 10/20/2017 9.8  8.4 - 10.4 mg/dL Final   • Total Protein 10/20/2017 6.9  6.3 - 8.7 g/dL Final   • Albumin 10/20/2017 4.20  3.50 - 5.00 g/dL Final   • ALT (SGPT) 10/20/2017 51  0 - 54 U/L Final   • AST (SGOT) 10/20/2017 27  7 - 45 U/L Final   • Alkaline Phosphatase 10/20/2017 47  24 - 120 U/L Final   • Total Bilirubin 10/20/2017 0.4  0.1 - 1.0 mg/dL Final   • eGFR Non  Amer 10/20/2017 98  >60 mL/min/1.73 Final   • Globulin 10/20/2017 2.7  gm/dL Final   • A/G Ratio 10/20/2017 1.6  1.1 - 2.5 g/dL Final   • BUN/Creatinine Ratio 10/20/2017 21.2    Final   • Anion Gap 10/20/2017 9.0  4.0 - 13.0 mmol/L Final   • LDH 10/20/2017 346  265 - 665 U/L Final   • AFP Tumor Marker 10/20/2017 2.8  0.0 - 8.3 ng/mL Final    Roche ECLIA methodology   • HCG Quantitative 10/20/2017 <1  0 - 3 mIU/mL Final    Roche ECLIA methodology   • WBC 10/20/2017 4.00* 4.80 - 10.80 10*3/mm3 Final   • RBC 10/20/2017 4.90  4.20 - 5.40 10*6/mm3 Final   • Hemoglobin 10/20/2017 15.1  14.0 - 18.0 g/dL Final   • Hematocrit 10/20/2017 43.2  40.0 - 52.0 % Final   • MCV 10/20/2017 88.2  82.0 - 95.0 fL Final   • MCH 10/20/2017 30.8  28.0 - 32.0 pg Final   • MCHC 10/20/2017 35.0  33.0 - 36.0 g/dL Final   • RDW 10/20/2017 12.9  12.0 - 15.0 % Final   • MPV 10/20/2017 8.8  6.0 - 12.0 fL Final   • Platelets 10/20/2017 204  130 - 400 10*3/mm3 Final   • Neutrophil % 10/20/2017 58.2  39.0 - 78.0 % Final   •  Lymphocyte % 10/20/2017 28.1  15.0 - 45.0 % Final   • Auto Mixed Cells % 10/20/2017 13.7  0.1 - 24.0 % Final   • Neutrophils, Absolute 10/20/2017 2.40  1.50 - 8.30 10*3/mm3 Final   • Lymphocytes, Absolute 10/20/2017 1.10  0.80 - 7.00 10*3/mm3 Final   • Auto Mixed Cells # 10/20/2017 0.50  0.10 - 2.60 10*3/mm3 Final       RADIOLOGY:  No results found.         Assessment/Plan  Alex Vann is a 42 y.o. year old male non seminoma GCT, s/p BEP x3, s/p resection f teratoma 2012, who is currently without any evidence of disease.    Patient Active Problem List   Diagnosis   • Malignant neoplasm of right testis   • Teratoma          1.  Last CT chest abdomen and pelvis done on 04/18/2017 shows no evidence of disease recurrence.  I reviewed his lab with him today, alpha-fetoprotein of 2.8, beta hCG less than 1, .  I also reviewed surveillance schedule with patient, he is I5 years out and per NCCN guidelines, will obtain CT abdomen and pelvis only if clinical indicated, will continue yearly chest x-ray.  Patient was given the opportunity to ask questions which were answered to satisfaction.  I will see him in 6 months with a chest x-ray then after that we will go to yearly physical exam with CXR and tumor marker. I advised him he can get markers at PCP if that makes him less nervous.       Richar Hope MD    10/24/2017    12:43 PM

## 2017-10-30 ENCOUNTER — OFFICE VISIT (OUTPATIENT)
Dept: VASCULAR SURGERY | Age: 42
End: 2017-10-30
Payer: COMMERCIAL

## 2017-10-30 VITALS
SYSTOLIC BLOOD PRESSURE: 138 MMHG | DIASTOLIC BLOOD PRESSURE: 88 MMHG | HEART RATE: 74 BPM | TEMPERATURE: 97.2 F | RESPIRATION RATE: 18 BRPM

## 2017-10-30 DIAGNOSIS — C62.90 MALIGNANT NEOPLASM OF TESTIS, UNSPECIFIED LATERALITY, UNSPECIFIED WHETHER DESCENDED OR UNDESCENDED: Primary | ICD-10-CM

## 2017-10-30 PROCEDURE — 99212 OFFICE O/P EST SF 10 MIN: CPT | Performed by: NURSE PRACTITIONER

## 2017-10-30 RX ORDER — VITAMIN B COMPLEX
1 CAPSULE ORAL DAILY
COMMUNITY

## 2017-10-30 RX ORDER — MONTELUKAST SODIUM 10 MG/1
10 TABLET ORAL DAILY
COMMUNITY

## 2017-10-30 RX ORDER — LEVOCETIRIZINE DIHYDROCHLORIDE 5 MG/1
5 TABLET, FILM COATED ORAL NIGHTLY
COMMUNITY

## 2017-10-30 RX ORDER — AZELASTINE HYDROCHLORIDE, FLUTICASONE PROPIONATE 137; 50 UG/1; UG/1
SPRAY, METERED NASAL
COMMUNITY

## 2017-10-30 RX ORDER — CARISOPRODOL 250 MG/1
350 TABLET ORAL PRN
COMMUNITY

## 2017-10-30 NOTE — LETTER
Mustapha Griffith   1975      Surgery Directions    1. Report to the outpatient registration at [facility] on Fri. 17, at 6:00am.  2. Nothing to eat or drink after midnight the night before the procedure. 3. Please take all heart and blood pressure medicines with a sip of water. 4. Do not take Lasix, insulin, or any diabetic medicine the morning of the procedure. If you take insulin, you may only take 1/2 of any scheduled nightly dose, and none the morning of the procedure. You may take all regularly scheduled heart, cholesterol, and blood pressure medicines with a sip of water. 5. If you take Glucophage, Metformin, Actos Plus Met, or Glucovance,please tell our nurse. it may interfere with some of the medications given during surgery. 6. Hold Plavix/Coumadin for 0 days prior to surgery. 7. If you have sleep apnea and require C-PAP, please bring this with you to the hospital.  8. Bring a list of all of your allergies and medications with you to the hospital.  9. Please let our nurse know if you have had an allergy to iodine, shellfish, or x-ray dye. 10. Let the nurse know if you take any of the followin. Over the counter herbal supplements  2. Diclofenec, indomethacin, ketoprofen, Caridopa/levadopa, naproxen, sulindac, piroxicam, glucosamine, Chondrotin, cocchine, or methotrexate. 11. Plan to stay at the hospital for several hours. You will need a  to take you home. 12. Medications instructed to hold: See above. 13. Please stop at your local [facility] or pharmacy and buy a bottle of Hibiclens. Wash thoroughly with this the night before and the morning of the procedure, paying special attention to the area that will be operated on. Make sure you rinse very well. The Hibiclens should only be used prior to surgery. 14. Take your pitavastatin (Livalo) as normally scheduled to take. 15. Other Directions: If you have any questions please call the office at [phone].

## 2017-10-30 NOTE — LETTER
Olga Ambrose   1975      Surgery Directions    1. Report to the outpatient registration at [facility] on Fri. 17, at 6:00am.  2. Nothing to eat or drink after midnight the night before the procedure. 3. Please take all heart and blood pressure medicines with a sip of water. 4. Do not take Lasix, insulin, or any diabetic medicine the morning of the procedure. If you take insulin, you may only take 1/2 of any scheduled nightly dose, and none the morning of the procedure. You may take all regularly scheduled heart, cholesterol, and blood pressure medicines with a sip of water. 5. If you take Glucophage, Metformin, Actos Plus Met, or Glucovance,please tell our nurse. it may interfere with some of the medications given during surgery. 6. Hold Plavix/Coumadin for 0 days prior to surgery. 7. If you have sleep apnea and require C-PAP, please bring this with you to the hospital.  8. Bring a list of all of your allergies and medications with you to the hospital.  9. Please let our nurse know if you have had an allergy to iodine, shellfish, or x-ray dye. 10. Let the nurse know if you take any of the followin. Over the counter herbal supplements  2. Diclofenec, indomethacin, ketoprofen, Caridopa/levadopa, naproxen, sulindac, piroxicam, glucosamine, Chondrotin, cocchine, or methotrexate. 11. Plan to stay at the hospital for several hours. You will need a  to take you home. 12. Medications instructed to hold: See above. 13. Please stop at your local [facility] or pharmacy and buy a bottle of Hibiclens. Wash thoroughly with this the night before and the morning of the procedure, paying special attention to the area that will be operated on. Make sure you rinse very well. The Hibiclens should only be used prior to surgery. 14. Take your pitavastatin (Livalo) as normally scheduled to take.   15. You will need to register at the [facility] outpatient registration on

## 2017-10-30 NOTE — ADDENDUM NOTE
Encounter addended by: José Lal RN on: 10/30/2017 11:47 AM<BR>    Actions taken: Letter status changed

## 2017-10-31 NOTE — PROGRESS NOTES
No care team member to display      History and Physical    Vonnie Myers has a history of testicular cancer and has now completed treatment. He has had this for 1 - 5 years. He is in need of mediport removal.  We have been asked to remove this for him. Old records have been obtained, reviewed, and summarized. Pato Bowles is a 36 y.o. male with the following history reviewed and recorded in [facility]: There are no active problems to display for this patient. Current Outpatient Prescriptions   Medication Sig Dispense Refill    carisoprodol (SOMA) 250 MG tablet Take 350 mg by mouth as needed      pitavastatin (LIVALO) 4 MG TABS tablet Take by mouth daily      levocetirizine (XYZAL) 5 MG tablet Take 5 mg by mouth nightly      montelukast (SINGULAIR) 10 MG tablet Take 10 mg by mouth daily      Azelastine-Fluticasone (DYMISTA) 137-50 MCG/ACT SUSP by Nasal route      b complex vitamins capsule Take 1 capsule by mouth daily       No current facility-administered medications for this visit. Allergies: Sulfa antibiotics  Past Medical History:   Diagnosis Date    Hyperlipidemia     Osteoarthritis     Testicular cancer [facility])      Past Surgical History:   Procedure Laterality Date    JOINT REPLACEMENT      knee arthroscopy    OTHER SURGICAL HISTORY  1/12/2012    [facility]JS    LT IJ vein Port-a-Cath placement.  (Bard power port single lumen) with U/ guidance    TESTICLE REMOVAL      right orichectomy    VASECTOMY       Family History   Problem Relation Age of Onset    Heart Disease Mother     High Cholesterol Other     High Blood Pressure Other     Heart Disease Other     Cancer Other      colon, breast     Social History   Substance Use Topics    Smoking status: Never Smoker    Smokeless tobacco: Never Used    Alcohol use 1.2 oz/week     2 Glasses of wine per week      Comment: ocass         Review of Systems    Constitutional  no significant activity change, appetite change, or unexpected Left popliteal pulse: absent; Left DP: absent; Left PT: absent No cyanosis, clubbing, or significant edema. No signs atheroembolic event. Pulmonary  effort appears normal.  No respiratory distress. Lungs - Breath sounds normal. No wheezes or rales. GI - Abdomen  soft, non tender, bowel sounds X 4 quadrants. No guarding or rebound tenderness. No distension or palpable mass. Genitourinary  deferred. Musculoskeletal  ROM appears normal.  No significant edema. Neurologic  alert and oriented X 3. Physiologic. Skin  warm, dry, and intact. No rash, erythema, or pallor. Psychiatric  mood, affect, and behavior appear normal.  Judgment and thought processes appear normal.      Options have been discussed with the patient including continued medical management vs. proceeding with mediport placement. Patient has opted to proceed with mediport placement. Risks have been discussed with the patient including but not limited to MI, death, CVA, bleed, nerve injury, and infection. Assessment    1.  Malignant neoplasm of testis, unspecified laterality, unspecified whether descended or undescended [facility])          Plan    Removal of mediport      Recommend no smoking  Strongly encourage statin therapy

## 2017-11-17 ENCOUNTER — HOSPITAL ENCOUNTER (OUTPATIENT)
Dept: GENERAL RADIOLOGY | Age: 42
Discharge: HOME | End: 2017-11-17
Payer: COMMERCIAL

## 2017-11-17 ENCOUNTER — HOSPITAL ENCOUNTER (OUTPATIENT)
Dept: PREADMISSION TESTING | Age: 42
Discharge: HOME | End: 2017-11-17
Payer: COMMERCIAL

## 2017-11-17 VITALS — HEIGHT: 68 IN | BODY MASS INDEX: 33.04 KG/M2 | WEIGHT: 218 LBS

## 2017-11-17 LAB
ANION GAP SERPL CALCULATED.3IONS-SCNC: 15 MMOL/L (ref 7–19)
APTT: 31 SEC (ref 26–36.2)
BASOPHILS ABSOLUTE: 0 K/UL (ref 0–0.2)
BASOPHILS RELATIVE PERCENT: 0.9 % (ref 0–1)
BUN BLDV-MCNC: 17 MG/DL (ref 6–20)
CALCIUM SERPL-MCNC: 9.9 MG/DL (ref 8.6–10)
CHLORIDE BLD-SCNC: 103 MMOL/L (ref 98–111)
CO2: 23 MMOL/L (ref 22–29)
CREAT SERPL-MCNC: 0.8 MG/DL (ref 0.5–1.2)
EOSINOPHILS ABSOLUTE: 0.2 K/UL (ref 0–0.6)
EOSINOPHILS RELATIVE PERCENT: 3.7 % (ref 0–5)
GFR NON-AFRICAN AMERICAN: >60
GLUCOSE BLD-MCNC: 86 MG/DL (ref 74–109)
HCT VFR BLD CALC: 47.4 % (ref 42–52)
HEMOGLOBIN: 15.6 G/DL (ref 14–18)
INR BLD: 0.96 (ref 0.88–1.18)
LYMPHOCYTES ABSOLUTE: 1.6 K/UL (ref 1.1–4.5)
LYMPHOCYTES RELATIVE PERCENT: 35.1 % (ref 20–40)
MCH RBC QN AUTO: 30 PG (ref 27–31)
MCHC RBC AUTO-ENTMCNC: 32.9 G/DL (ref 33–37)
MCV RBC AUTO: 91.2 FL (ref 80–94)
MONOCYTES ABSOLUTE: 0.5 K/UL (ref 0–0.9)
MONOCYTES RELATIVE PERCENT: 11.7 % (ref 0–10)
NEUTROPHILS ABSOLUTE: 2.2 K/UL (ref 1.5–7.5)
NEUTROPHILS RELATIVE PERCENT: 48.6 % (ref 50–65)
PDW BLD-RTO: 12.6 % (ref 11.5–14.5)
PLATELET # BLD: 253 K/UL (ref 130–400)
PMV BLD AUTO: 9.8 FL (ref 9.4–12.4)
POTASSIUM SERPL-SCNC: 4 MMOL/L (ref 3.5–5)
PROTHROMBIN TIME: 12.7 SEC (ref 12–14.6)
RBC # BLD: 5.2 M/UL (ref 4.7–6.1)
SODIUM BLD-SCNC: 141 MMOL/L (ref 136–145)
WBC # BLD: 4.6 K/UL (ref 4.8–10.8)

## 2017-11-17 PROCEDURE — 93005 ELECTROCARDIOGRAM TRACING: CPT

## 2017-11-17 PROCEDURE — 71020 XR CHEST STANDARD TWO VW: CPT

## 2017-11-17 PROCEDURE — 85025 COMPLETE CBC W/AUTO DIFF WBC: CPT

## 2017-11-17 PROCEDURE — 85730 THROMBOPLASTIN TIME PARTIAL: CPT

## 2017-11-17 PROCEDURE — 80048 BASIC METABOLIC PNL TOTAL CA: CPT

## 2017-11-17 PROCEDURE — 85610 PROTHROMBIN TIME: CPT

## 2017-11-17 RX ORDER — MELOXICAM 15 MG/1
15 TABLET ORAL DAILY
COMMUNITY

## 2017-11-19 ENCOUNTER — PREP FOR PROCEDURE (OUTPATIENT)
Dept: VASCULAR SURGERY | Age: 42
End: 2017-11-19

## 2017-11-19 RX ORDER — SODIUM CHLORIDE 0.9 % (FLUSH) 0.9 %
10 SYRINGE (ML) INJECTION PRN
Status: CANCELLED | OUTPATIENT
Start: 2017-11-19

## 2017-11-19 RX ORDER — SODIUM CHLORIDE 0.9 % (FLUSH) 0.9 %
10 SYRINGE (ML) INJECTION EVERY 12 HOURS SCHEDULED
Status: CANCELLED | OUTPATIENT
Start: 2017-11-19

## 2017-11-19 NOTE — H&P
No care team member to display      History and Physical    Germania Dickerson has a history of testicular cancer and has now completed treatment. He has had this for 1 - 5 years. He is in need of mediport removal.  We have been asked to remove this for him. Old records have been obtained, reviewed, and summarized. Jeffrey Steele is a 36 y.o. male with the following history reviewed and recorded in [facility]: There are no active problems to display for this patient. Current Outpatient Prescriptions   Medication Sig Dispense Refill    carisoprodol (SOMA) 250 MG tablet Take 350 mg by mouth as needed      pitavastatin (LIVALO) 4 MG TABS tablet Take by mouth daily      levocetirizine (XYZAL) 5 MG tablet Take 5 mg by mouth nightly      montelukast (SINGULAIR) 10 MG tablet Take 10 mg by mouth daily      Azelastine-Fluticasone (DYMISTA) 137-50 MCG/ACT SUSP by Nasal route      b complex vitamins capsule Take 1 capsule by mouth daily       No current facility-administered medications for this visit. Allergies: Sulfa antibiotics  Past Medical History:   Diagnosis Date    Hyperlipidemia     Osteoarthritis     Testicular cancer [facility])      Past Surgical History:   Procedure Laterality Date    JOINT REPLACEMENT      knee arthroscopy    OTHER SURGICAL HISTORY  1/12/2012    [facility]    LT IJ vein Port-a-Cath placement.  (Bard power port single lumen) with U/ guidance    TESTICLE REMOVAL      right orichectomy    VASECTOMY       Family History   Problem Relation Age of Onset    Heart Disease Mother     High Cholesterol Other     High Blood Pressure Other     Heart Disease Other     Cancer Other      colon, breast     Social History   Substance Use Topics    Smoking status: Never Smoker    Smokeless tobacco: Never Used    Alcohol use 1.2 oz/week     2 Glasses of wine per week      Comment: ocass         Review of Systems    Constitutional - no significant activity change, appetite change, or unexpected Left popliteal pulse: absent; Left DP: absent; Left PT: absent No cyanosis, clubbing, or significant edema. No signs atheroembolic event. Pulmonary - effort appears normal.  No respiratory distress. Lungs - Breath sounds normal. No wheezes or rales. GI - Abdomen - soft, non tender, bowel sounds X 4 quadrants. No guarding or rebound tenderness. No distension or palpable mass. Genitourinary - deferred. Musculoskeletal - ROM appears normal.  No significant edema. Neurologic - alert and oriented X 3. Physiologic. Skin - warm, dry, and intact. No rash, erythema, or pallor. Psychiatric - mood, affect, and behavior appear normal.  Judgment and thought processes appear normal.      Options have been discussed with the patient including continued medical management vs. proceeding with mediport placement. Patient has opted to proceed with mediport placement. Risks have been discussed with the patient including but not limited to MI, death, CVA, bleed, nerve injury, and infection. Assessment    1.  Malignant neoplasm of testis, unspecified laterality, unspecified whether descended or undescended [facility])          Plan    Removal of mediport      Recommend no smoking  Strongly encourage statin therapy

## 2017-11-20 LAB
EKG P AXIS: 15 DEGREES
EKG P-R INTERVAL: 168 MS
EKG Q-T INTERVAL: 386 MS
EKG QRS DURATION: 92 MS
EKG QTC CALCULATION (BAZETT): 393 MS
EKG T AXIS: 25 DEGREES

## 2017-12-01 ENCOUNTER — ANESTHESIA (OUTPATIENT)
Dept: OPERATING ROOM | Age: 42
End: 2017-12-01
Payer: COMMERCIAL

## 2017-12-01 ENCOUNTER — HOSPITAL ENCOUNTER (OUTPATIENT)
Age: 42
Setting detail: OUTPATIENT SURGERY
Discharge: HOME | End: 2017-12-01
Attending: SURGERY | Admitting: SURGERY
Payer: COMMERCIAL

## 2017-12-01 ENCOUNTER — ANESTHESIA EVENT (OUTPATIENT)
Dept: OPERATING ROOM | Age: 42
End: 2017-12-01
Payer: COMMERCIAL

## 2017-12-01 VITALS
RESPIRATION RATE: 14 BRPM | HEIGHT: 68 IN | OXYGEN SATURATION: 92 % | SYSTOLIC BLOOD PRESSURE: 170 MMHG | TEMPERATURE: 97.6 F | BODY MASS INDEX: 33.04 KG/M2 | DIASTOLIC BLOOD PRESSURE: 87 MMHG | WEIGHT: 218 LBS | HEART RATE: 67 BPM

## 2017-12-01 VITALS
OXYGEN SATURATION: 93 % | DIASTOLIC BLOOD PRESSURE: 55 MMHG | RESPIRATION RATE: 8 BRPM | SYSTOLIC BLOOD PRESSURE: 99 MMHG

## 2017-12-01 PROBLEM — Z85.47 H/O TESTICULAR CANCER: Status: ACTIVE | Noted: 2017-12-01

## 2017-12-01 PROBLEM — Z95.828 PORT CATHETER IN PLACE: Status: ACTIVE | Noted: 2017-12-01

## 2017-12-01 PROCEDURE — 3600000002 HC SURGERY LEVEL 2 BASE: Performed by: SURGERY

## 2017-12-01 PROCEDURE — 7100000010 HC PHASE II RECOVERY - FIRST 15 MIN: Performed by: SURGERY

## 2017-12-01 PROCEDURE — 6360000002 HC RX W HCPCS: Performed by: NURSE PRACTITIONER

## 2017-12-01 PROCEDURE — 7100000001 HC PACU RECOVERY - ADDTL 15 MIN: Performed by: SURGERY

## 2017-12-01 PROCEDURE — 7100000011 HC PHASE II RECOVERY - ADDTL 15 MIN: Performed by: SURGERY

## 2017-12-01 PROCEDURE — 3700000001 HC ADD 15 MINUTES (ANESTHESIA): Performed by: SURGERY

## 2017-12-01 PROCEDURE — 3700000000 HC ANESTHESIA ATTENDED CARE: Performed by: SURGERY

## 2017-12-01 PROCEDURE — 2500000003 HC RX 250 WO HCPCS: Performed by: NURSE ANESTHETIST, CERTIFIED REGISTERED

## 2017-12-01 PROCEDURE — 3600000012 HC SURGERY LEVEL 2 ADDTL 15MIN: Performed by: SURGERY

## 2017-12-01 PROCEDURE — 7100000000 HC PACU RECOVERY - FIRST 15 MIN: Performed by: SURGERY

## 2017-12-01 PROCEDURE — 2580000003 HC RX 258: Performed by: SURGERY

## 2017-12-01 PROCEDURE — A4364 ADHESIVE, LIQUID OR EQUAL: HCPCS | Performed by: SURGERY

## 2017-12-01 PROCEDURE — 36590 REMOVAL TUNNELED CV CATH: CPT | Performed by: SURGERY

## 2017-12-01 PROCEDURE — 2720000001 HC MISC SURG SUPPLY STERILE $51-500: Performed by: SURGERY

## 2017-12-01 PROCEDURE — 6370000000 HC RX 637 (ALT 250 FOR IP): Performed by: SURGERY

## 2017-12-01 PROCEDURE — 6360000002 HC RX W HCPCS: Performed by: SURGERY

## 2017-12-01 PROCEDURE — 6360000002 HC RX W HCPCS: Performed by: NURSE ANESTHETIST, CERTIFIED REGISTERED

## 2017-12-01 RX ORDER — LABETALOL HYDROCHLORIDE 5 MG/ML
5 INJECTION, SOLUTION INTRAVENOUS EVERY 10 MIN PRN
Status: DISCONTINUED | OUTPATIENT
Start: 2017-12-01 | End: 2017-12-01 | Stop reason: HOSPADM

## 2017-12-01 RX ORDER — ONDANSETRON 2 MG/ML
INJECTION INTRAMUSCULAR; INTRAVENOUS PRN
Status: DISCONTINUED | OUTPATIENT
Start: 2017-12-01 | End: 2017-12-01 | Stop reason: SDUPTHER

## 2017-12-01 RX ORDER — DIPHENHYDRAMINE HYDROCHLORIDE 50 MG/ML
12.5 INJECTION INTRAMUSCULAR; INTRAVENOUS
Status: DISCONTINUED | OUTPATIENT
Start: 2017-12-01 | End: 2017-12-01 | Stop reason: HOSPADM

## 2017-12-01 RX ORDER — MORPHINE SULFATE 4 MG/ML
2 INJECTION, SOLUTION INTRAMUSCULAR; INTRAVENOUS EVERY 5 MIN PRN
Status: DISCONTINUED | OUTPATIENT
Start: 2017-12-01 | End: 2017-12-01 | Stop reason: HOSPADM

## 2017-12-01 RX ORDER — FENTANYL CITRATE 50 UG/ML
INJECTION, SOLUTION INTRAMUSCULAR; INTRAVENOUS PRN
Status: DISCONTINUED | OUTPATIENT
Start: 2017-12-01 | End: 2017-12-01 | Stop reason: SDUPTHER

## 2017-12-01 RX ORDER — ENALAPRILAT 2.5 MG/2ML
1.25 INJECTION INTRAVENOUS
Status: DISCONTINUED | OUTPATIENT
Start: 2017-12-01 | End: 2017-12-01 | Stop reason: HOSPADM

## 2017-12-01 RX ORDER — PROPOFOL 10 MG/ML
INJECTION, EMULSION INTRAVENOUS PRN
Status: DISCONTINUED | OUTPATIENT
Start: 2017-12-01 | End: 2017-12-01 | Stop reason: SDUPTHER

## 2017-12-01 RX ORDER — SODIUM CHLORIDE 0.9 % (FLUSH) 0.9 %
10 SYRINGE (ML) INJECTION PRN
Status: DISCONTINUED | OUTPATIENT
Start: 2017-12-01 | End: 2017-12-01 | Stop reason: HOSPADM

## 2017-12-01 RX ORDER — OXYCODONE AND ACETAMINOPHEN 7.5; 325 MG/1; MG/1
1 TABLET ORAL EVERY 6 HOURS PRN
Qty: 30 TABLET | Refills: 0 | Status: SHIPPED | OUTPATIENT
Start: 2017-12-01 | End: 2017-12-08

## 2017-12-01 RX ORDER — OXYCODONE HYDROCHLORIDE 5 MG/1
10 TABLET ORAL EVERY 4 HOURS PRN
Status: DISCONTINUED | OUTPATIENT
Start: 2017-12-01 | End: 2017-12-01 | Stop reason: HOSPADM

## 2017-12-01 RX ORDER — MEPERIDINE HYDROCHLORIDE 50 MG/ML
12.5 INJECTION INTRAMUSCULAR; INTRAVENOUS; SUBCUTANEOUS EVERY 5 MIN PRN
Status: DISCONTINUED | OUTPATIENT
Start: 2017-12-01 | End: 2017-12-01 | Stop reason: HOSPADM

## 2017-12-01 RX ORDER — METOCLOPRAMIDE HYDROCHLORIDE 5 MG/ML
10 INJECTION INTRAMUSCULAR; INTRAVENOUS
Status: DISCONTINUED | OUTPATIENT
Start: 2017-12-01 | End: 2017-12-01 | Stop reason: HOSPADM

## 2017-12-01 RX ORDER — DEXAMETHASONE SODIUM PHOSPHATE 10 MG/ML
INJECTION INTRAMUSCULAR; INTRAVENOUS PRN
Status: DISCONTINUED | OUTPATIENT
Start: 2017-12-01 | End: 2017-12-01 | Stop reason: SDUPTHER

## 2017-12-01 RX ORDER — MORPHINE SULFATE 4 MG/ML
4 INJECTION, SOLUTION INTRAMUSCULAR; INTRAVENOUS EVERY 5 MIN PRN
Status: DISCONTINUED | OUTPATIENT
Start: 2017-12-01 | End: 2017-12-01 | Stop reason: HOSPADM

## 2017-12-01 RX ORDER — LIDOCAINE HYDROCHLORIDE 10 MG/ML
INJECTION, SOLUTION EPIDURAL; INFILTRATION; INTRACAUDAL; PERINEURAL PRN
Status: DISCONTINUED | OUTPATIENT
Start: 2017-12-01 | End: 2017-12-01 | Stop reason: SDUPTHER

## 2017-12-01 RX ORDER — PROMETHAZINE HYDROCHLORIDE 25 MG/ML
6.25 INJECTION, SOLUTION INTRAMUSCULAR; INTRAVENOUS
Status: DISCONTINUED | OUTPATIENT
Start: 2017-12-01 | End: 2017-12-01 | Stop reason: HOSPADM

## 2017-12-01 RX ORDER — ONDANSETRON 2 MG/ML
4 INJECTION INTRAMUSCULAR; INTRAVENOUS EVERY 8 HOURS PRN
Status: DISCONTINUED | OUTPATIENT
Start: 2017-12-01 | End: 2017-12-01 | Stop reason: HOSPADM

## 2017-12-01 RX ORDER — HYDRALAZINE HYDROCHLORIDE 20 MG/ML
5 INJECTION INTRAMUSCULAR; INTRAVENOUS EVERY 10 MIN PRN
Status: DISCONTINUED | OUTPATIENT
Start: 2017-12-01 | End: 2017-12-01 | Stop reason: HOSPADM

## 2017-12-01 RX ORDER — ACETAMINOPHEN 325 MG/1
650 TABLET ORAL EVERY 4 HOURS PRN
Status: DISCONTINUED | OUTPATIENT
Start: 2017-12-01 | End: 2017-12-01 | Stop reason: HOSPADM

## 2017-12-01 RX ORDER — MIDAZOLAM HYDROCHLORIDE 1 MG/ML
INJECTION INTRAMUSCULAR; INTRAVENOUS PRN
Status: DISCONTINUED | OUTPATIENT
Start: 2017-12-01 | End: 2017-12-01 | Stop reason: SDUPTHER

## 2017-12-01 RX ORDER — OXYCODONE AND ACETAMINOPHEN 7.5; 325 MG/1; MG/1
1 TABLET ORAL ONCE
Status: COMPLETED | OUTPATIENT
Start: 2017-12-01 | End: 2017-12-01

## 2017-12-01 RX ORDER — OXYCODONE HYDROCHLORIDE 5 MG/1
5 TABLET ORAL EVERY 4 HOURS PRN
Status: DISCONTINUED | OUTPATIENT
Start: 2017-12-01 | End: 2017-12-01 | Stop reason: HOSPADM

## 2017-12-01 RX ORDER — SODIUM CHLORIDE, SODIUM LACTATE, POTASSIUM CHLORIDE, CALCIUM CHLORIDE 600; 310; 30; 20 MG/100ML; MG/100ML; MG/100ML; MG/100ML
INJECTION, SOLUTION INTRAVENOUS CONTINUOUS
Status: DISCONTINUED | OUTPATIENT
Start: 2017-12-01 | End: 2017-12-01 | Stop reason: HOSPADM

## 2017-12-01 RX ORDER — SODIUM CHLORIDE 0.9 % (FLUSH) 0.9 %
10 SYRINGE (ML) INJECTION EVERY 12 HOURS SCHEDULED
Status: DISCONTINUED | OUTPATIENT
Start: 2017-12-01 | End: 2017-12-01 | Stop reason: HOSPADM

## 2017-12-01 RX ADMIN — SODIUM CHLORIDE, SODIUM LACTATE, POTASSIUM CHLORIDE, AND CALCIUM CHLORIDE: 600; 310; 30; 20 INJECTION, SOLUTION INTRAVENOUS at 06:35

## 2017-12-01 RX ADMIN — CEFAZOLIN SODIUM 2 G: 2 SOLUTION INTRAVENOUS at 07:49

## 2017-12-01 RX ADMIN — LIDOCAINE HYDROCHLORIDE 50 MG: 10 INJECTION, SOLUTION EPIDURAL; INFILTRATION; INTRACAUDAL; PERINEURAL at 07:46

## 2017-12-01 RX ADMIN — ONDANSETRON HYDROCHLORIDE 4 MG: 2 SOLUTION INTRAMUSCULAR; INTRAVENOUS at 07:54

## 2017-12-01 RX ADMIN — OXYCODONE HYDROCHLORIDE AND ACETAMINOPHEN 1 TABLET: 7.5; 325 TABLET ORAL at 09:25

## 2017-12-01 RX ADMIN — DEXAMETHASONE SODIUM PHOSPHATE 10 MG: 10 INJECTION INTRAMUSCULAR; INTRAVENOUS at 07:54

## 2017-12-01 RX ADMIN — FENTANYL CITRATE 50 MCG: 50 INJECTION, SOLUTION INTRAMUSCULAR; INTRAVENOUS at 07:44

## 2017-12-01 RX ADMIN — PROPOFOL 100 MG: 10 INJECTION, EMULSION INTRAVENOUS at 08:00

## 2017-12-01 RX ADMIN — FENTANYL CITRATE 50 MCG: 50 INJECTION, SOLUTION INTRAMUSCULAR; INTRAVENOUS at 07:56

## 2017-12-01 RX ADMIN — PROPOFOL 200 MG: 10 INJECTION, EMULSION INTRAVENOUS at 07:46

## 2017-12-01 RX ADMIN — MIDAZOLAM HYDROCHLORIDE 2 MG: 1 INJECTION, SOLUTION INTRAMUSCULAR; INTRAVENOUS at 07:40

## 2017-12-01 ASSESSMENT — PAIN DESCRIPTION - PAIN TYPE: TYPE: SURGICAL PAIN

## 2017-12-01 ASSESSMENT — PAIN SCALES - GENERAL
PAINLEVEL_OUTOF10: 6
PAINLEVEL_OUTOF10: 6

## 2017-12-01 ASSESSMENT — PAIN - FUNCTIONAL ASSESSMENT: PAIN_FUNCTIONAL_ASSESSMENT: 0-10

## 2017-12-01 ASSESSMENT — PAIN DESCRIPTION - ORIENTATION: ORIENTATION: LEFT

## 2017-12-01 ASSESSMENT — PAIN DESCRIPTION - LOCATION: LOCATION: CHEST

## 2017-12-01 NOTE — H&P (VIEW-ONLY)
No care team member to display      History and Physical    Emmie Mendoza has a history of testicular cancer and has now completed treatment. He has had this for 1 - 5 years. He is in need of mediport removal.  We have been asked to remove this for him. Old records have been obtained, reviewed, and summarized. Satya Armenta is a 36 y.o. male with the following history reviewed and recorded in [facility]: There are no active problems to display for this patient. Current Outpatient Prescriptions   Medication Sig Dispense Refill    carisoprodol (SOMA) 250 MG tablet Take 350 mg by mouth as needed      pitavastatin (LIVALO) 4 MG TABS tablet Take by mouth daily      levocetirizine (XYZAL) 5 MG tablet Take 5 mg by mouth nightly      montelukast (SINGULAIR) 10 MG tablet Take 10 mg by mouth daily      Azelastine-Fluticasone (DYMISTA) 137-50 MCG/ACT SUSP by Nasal route      b complex vitamins capsule Take 1 capsule by mouth daily       No current facility-administered medications for this visit. Allergies: Sulfa antibiotics  Past Medical History:   Diagnosis Date    Hyperlipidemia     Osteoarthritis     Testicular cancer [facility])      Past Surgical History:   Procedure Laterality Date    JOINT REPLACEMENT      knee arthroscopy    OTHER SURGICAL HISTORY  1/12/2012    SJS    LT IJ vein Port-a-Cath placement.  (Bard power port single lumen) with U/ guidance    TESTICLE REMOVAL      right orichectomy    VASECTOMY       Family History   Problem Relation Age of Onset    Heart Disease Mother     High Cholesterol Other     High Blood Pressure Other     Heart Disease Other     Cancer Other      colon, breast     Social History   Substance Use Topics    Smoking status: Never Smoker    Smokeless tobacco: Never Used    Alcohol use 1.2 oz/week     2 Glasses of wine per week      Comment: ocass         Review of Systems    Constitutional  no significant activity change, appetite change, or unexpected weight change. No fever or chills. No diaphoresis or significant fatigue. HENT  no significant rhinorrhea or epistaxis. No tinnitus or significant hearing loss. Eyes  no sudden vision change or amaurosis. Respiratory  no significant shortness of breath, wheezing, or stridor. No apnea, cough, or chest tightness associated with shortness of breath. Cardiovascular  no chest pain, syncope, or significant dizziness. No palpitations or significant leg swelling. Patient reports no claudication. Gastrointestinal  no abdominal swelling or pain. No blood in stool. No severe constipation, diarrhea, nausea, or vomiting. Genitourinary  No difficulty urinating, dysuria, frequency, or urgency. No flank pain or hematuria. Musculoskeletal  no back pain, gait disturbance, or myalgia. Skin  no color change, rash, pallor, or new wound. Neurologic  no dizziness, facial asymmetry, or light headedness. No seizures. No speech difficulty or lateralizing weakness. Hematologic  no easy bruising or excessive bleeding. Psychiatric  no severe anxiety or nervousness. No confusion. All other review of systems are negative. Physical Exam    /88 Comment: left  Pulse 74   Temp 97.2 °F (36.2 °C)   Resp 18     Constitutional  well developed, well nourished. No diaphoresis or acute distress. HENT  head normocephalic. Right external ear canal appears normal.  Left external ear canal appears normal.  Septum appears midline. Eyes  conjunctiva normal.  EOMS normal.  No exudate. No icterus. Neck- ROM appears normal, no tracheal deviation. Cardiovascular  Regular rate and rhythm. Heart sounds are normal.  No murmur, rub, or gallop. Carotid pulses are 2+ to palpation bilaterally without bruit. Extremities - Radial and brachial pulses are 2+ to palpation bilaterally. Right femoral pulse: present 2+; Right popliteal pulse: absent Right DP: absent; Right PT absent;  Left femoral pulse: present 2+; Left popliteal pulse: absent; Left DP: absent; Left PT: absent No cyanosis, clubbing, or significant edema. No signs atheroembolic event. Pulmonary  effort appears normal.  No respiratory distress. Lungs - Breath sounds normal. No wheezes or rales. GI - Abdomen  soft, non tender, bowel sounds X 4 quadrants. No guarding or rebound tenderness. No distension or palpable mass. Genitourinary  deferred. Musculoskeletal  ROM appears normal.  No significant edema. Neurologic  alert and oriented X 3. Physiologic. Skin  warm, dry, and intact. No rash, erythema, or pallor. Psychiatric  mood, affect, and behavior appear normal.  Judgment and thought processes appear normal.      Options have been discussed with the patient including continued medical management vs. proceeding with mediport placement. Patient has opted to proceed with mediport placement. Risks have been discussed with the patient including but not limited to MI, death, CVA, bleed, nerve injury, and infection. Assessment    1.  Malignant neoplasm of testis, unspecified laterality, unspecified whether descended or undescended [facility])          Plan    Removal of mediport      Recommend no smoking  Strongly encourage statin therapy

## 2017-12-01 NOTE — OP NOTE
Preoperative Diagnosis:  1. Indwelling subcutaneous vascular access device (portacath) left internal jugular vein  2. History of testicular cancer   3. Port no longer needed     Postoperative Diagnosis:  Same    Operative Procedure:  1. Removal of left internal jugular vein subcutaneous vascular access device (portacath)    Surgeon:  Jessica Palencia. Angela Echevarria MD    Anesthesia: General    Estimated Blood Loss:  Less than 50 ml    Findings:  1. The port and catheter including sutures and capsule around the port were entirely removed. 2.  There were no signs of infection. Procedure in Detail:    After the patient was consented and given IV antibiotics, he was brought to the operating room and placed on the operating room table in the supine position. General was administered, and the patient's left chest and neck were prepped and draped in the usual sterile fashion. Next, an incision was made in the chest with knife through the previous scar. Subcutaneous tissue was dissected with bovie electrocautery. The catheter was dissected away from the tunnel. The catheter was removed through this tunnel and the tunnel was ligated with 3-0 PDS suture. The port, including capsule and anchoring sutures were then completely removed the bovie electrocautery and sharp dissection. They were passed off the field. Hemostasis was achieved with bovie electrocautery. The wound was irrigated with saline and closed with 3-0 PDS subcutaneous suture, 4-0 monocryl subcuticular running suture and dermabond skin adhesive. The patient tolerated the procedure well and was brought to the recovery room in good condition.

## 2017-12-01 NOTE — ANESTHESIA PRE PROCEDURE
SURGICAL HISTORY  1/12/2012    SJS    LT IJ vein Port-a-Cath placement. (Bard power port single lumen) with U/ guidance    TESTICLE REMOVAL      right orichectomy    VASECTOMY         Social History:    Social History   Substance Use Topics    Smoking status: Never Smoker    Smokeless tobacco: Never Used    Alcohol use 1.2 oz/week     2 Glasses of wine per week      Comment: occ                                Counseling given: Not Answered      Vital Signs (Current):   Vitals:    12/01/17 0617   BP: 138/88   Pulse: 68   Resp: 12   Temp: 98.1 °F (36.7 °C)   TempSrc: Tympanic   SpO2: 94%   Weight: 218 lb (98.9 kg)   Height: 5' 8\" (1.727 m)                                              BP Readings from Last 3 Encounters:   12/01/17 138/88   10/30/17 138/88       NPO Status: Time of last liquid consumption: 2000                        Time of last solid consumption: 2000                        Date of last liquid consumption: 11/30/17                        Date of last solid food consumption: 11/30/17    BMI:   Wt Readings from Last 3 Encounters:   12/01/17 218 lb (98.9 kg)   11/17/17 218 lb (98.9 kg)     Body mass index is 33.15 kg/m².     CBC:   Lab Results   Component Value Date    WBC 4.6 11/17/2017    RBC 5.20 11/17/2017    HGB 15.6 11/17/2017    HGB 16.9 06/23/2011    HCT 47.4 11/17/2017    HCT 39.9 02/22/2012    MCV 91.2 11/17/2017    RDW 12.6 11/17/2017     11/17/2017     02/22/2012       CMP:   Lab Results   Component Value Date     11/17/2017     03/05/2012    K 4.0 11/17/2017    K 4.2 03/05/2012     11/17/2017    CL 99 03/05/2012    CO2 23 11/17/2017    BUN 17 11/17/2017    CREATININE 0.8 11/17/2017    CREATININE 1.1 03/05/2012    LABGLOM >60 11/17/2017    GLUCOSE 86 11/17/2017    PROT 7.2 03/05/2012    CALCIUM 9.9 11/17/2017    ALKPHOS 61 03/05/2012    AST 16 03/05/2012    ALT 27 03/05/2012       POC Tests: No results for input(s): POCGLU, POCNA, POCK, POCCL, POCBUN, Sharif Hilliard in the last 72 hours. Coags:   Lab Results   Component Value Date    PROTIME 12.7 11/17/2017    PROTIME 11.20 01/06/2012    INR 0.96 11/17/2017    APTT 31.0 11/17/2017       HCG (If Applicable): No results found for: PREGTESTUR, PREGSERUM, HCG, HCGQUANT     ABGs: No results found for: PHART, PO2ART, GHS9WMY, WGI8BRF, BEART, J4QSTJQL     Type & Screen (If Applicable):  No results found for: LABABO, [address]    Anesthesia Evaluation  Patient summary reviewed and Nursing notes reviewed no history of anesthetic complications:   Airway: Mallampati: II  TM distance: >3 FB   Neck ROM: full  Mouth opening: > = 3 FB Dental:          Pulmonary:Negative Pulmonary ROS and normal exam                               Cardiovascular:Negative CV ROS             Beta Blocker:  Not on Beta Blocker         Neuro/Psych:   Negative Neuro/Psych ROS              GI/Hepatic/Renal:        (-) GERD      ROS comment: H/o testicular ca. Endo/Other:        (-) hypothyroidism, no Type II DM               Abdominal:           Vascular: negative vascular ROS. Anesthesia Plan      MAC     ASA 2       Induction: intravenous. MIPS: Postoperative opioids intended and Prophylactic antiemetics administered. Anesthetic plan and risks discussed with patient.                       Lizz Loaiza MD   12/1/2017

## 2018-04-20 ENCOUNTER — APPOINTMENT (OUTPATIENT)
Dept: LAB | Facility: HOSPITAL | Age: 43
End: 2018-04-20

## 2018-04-23 DIAGNOSIS — C62.91 MALIGNANT NEOPLASM OF RIGHT TESTIS, UNSPECIFIED WHETHER DESCENDED OR UNDESCENDED (HCC): Primary | ICD-10-CM

## 2018-04-27 ENCOUNTER — OFFICE VISIT (OUTPATIENT)
Dept: ONCOLOGY | Facility: CLINIC | Age: 43
End: 2018-04-27

## 2018-04-27 VITALS
HEIGHT: 68 IN | BODY MASS INDEX: 33.95 KG/M2 | TEMPERATURE: 97.5 F | HEART RATE: 93 BPM | SYSTOLIC BLOOD PRESSURE: 136 MMHG | DIASTOLIC BLOOD PRESSURE: 74 MMHG | OXYGEN SATURATION: 97 % | RESPIRATION RATE: 18 BRPM | WEIGHT: 224 LBS

## 2018-04-27 DIAGNOSIS — C62.90 MALIGNANT NEOPLASM OF TESTIS, UNSPECIFIED LATERALITY, UNSPECIFIED WHETHER DESCENDED OR UNDESCENDED: Primary | ICD-10-CM

## 2018-04-27 PROCEDURE — 99213 OFFICE O/P EST LOW 20 MIN: CPT | Performed by: INTERNAL MEDICINE

## 2018-04-27 RX ORDER — CHOLECALCIFEROL (VITAMIN D3) 125 MCG
500 CAPSULE ORAL DAILY
COMMUNITY

## 2018-04-27 NOTE — PROGRESS NOTES
[facility]  HEMATOLOGY & ONCOLOGY    Cancer Staging Information:  No matching staging information was found for the patient.      Subjective     VISIT DIAGNOSIS:   Encounter Diagnosis   Name Primary?   • Malignant neoplasm of testis, unspecified laterality, unspecified whether descended or undescended Yes       REASON FOR VISIT:     Chief Complaint   Patient presents with   • Follow-up     Testicular: He is here for f/u visit today        HEMATOLOGY / ONCOLOGY HISTORY:   Oncology/Hematology History    Mr. aVnn is a 39 year old male who initially sought urologic attention because of a vasectomy. The patient underwent surgery  on7/22/2008 without complications. He was seen again because of a scrotal mass on 8/14/2008. He was examined by Dr. Guadalupe at  th[facility]ime and was felt to have a sperm granuloma status post vasectomy and this was not felt to be of any consequence. This was a mass  1cm nodule above his right testicle.The patient returned to see Dr. Mack June 21, 2011, with the finding of a right scrotal mass sudden in  onset. The mass was identified approximately two weeks prior to visit. It had been increasing in size with no associated pain. The mass  was identified during a testicularself exam. Dr. Mack examined the right testicle revealing a mildly tender, firm, marble sized mass lower  aspect of the testicle.  Ultrasound examination was performed finding a 2.8 cm heterogeneous mass in the right testicle. There were some internal cysticchanges  consistent with necrosis. This was worrisome for testicular carcinoma. There is diffuse microlithiasis involving the right testes.The left  testes shows diffuse microlithiasis but less severe on the right. No lesion was appreciated. There was an epidermal cystinvolving the right  testes measuring 5 mm and has a benign appearance and a varicocele on the left testes noted as well.  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 Tumor was a maximum of 3.7 cm in  diameter, no lymphovascular invasion is noted. The margins were clear and spermatic cord was unrevealing. The patient has undergonea  CT scan of the chest which is normal. A CT abdomen and pelvis which is reported to show a 3 cm left hepatic dome compatible with a  hemangioma, but ultrasound correlation recommended. . Preoperative laboratory studies revealed a normal CBC, a chemistry profilewithin  normal range. I do not see a LDH performed. His beta HCG was elevated at 8 with a normal being 03. His alpha fetoprotein ass242.7 with  normal being 0 to 8.Patient underwent a retroperitoneal lymph node dissection on August 16, 2011. Results of this surgery showed  negative nodes. Stage I  disease. On 10/10/2011 his alphafetoproteinwas  3.6 and beta hCG was undetectable. His LDH was normal.On 12/16/2011 his alphafetoproteinwas  3.5 and a beta hCG was 194. His beta hCG was repeated in our office was 220. Outpatient CAT  scans were performed on 12/30/2011. The chest has been unremarkable. CT scan of the abdomen and pelvis revealed small lymphnodes  in the retroperitoneum and a 2.5 cm mass effect. Question of seroma versus lymph node involvement was raised. In this setting arising  markers within 4 months of his retroperitoneal lymph node dissection and the fact that these markers were negative and 2 monthsafter his  dissection is consistent with recurrence of his disease and the need for chemotherapy treatment.  The patient received 3 cycles of BEP therapy from 1/16/2012 to 3/12/2012. A repeat CAT scan of his abdomen and pelvis on  3/19/2014post his 3 cycles of chemotherapy showed a mass lesion in his retroperitoneum. He return to [facility]  and underwent a retroperitoneal dissection of this mass by   Siva, removing a benign teratoma on May 3, 2012. He is  undergoingsurveillance for recurrence at this time.          Malignant neoplasm of right testis    10/18/2016 Initial Diagnosis     Malignant neoplasm of right testis              INTERVAL HISTORY  Patient ID: Alex Vann is a 42 y.o. year old male NSGCT here for follow-up.  Denies any new abnormality.    Past Medical History:   Past Medical History:   Diagnosis Date   • Hyperlipidemia    • Malignant neoplasm of right testis 10/18/2016   • Osteoarthritis    • Testicular mass      Past Surgical History:   Past Surgical History:   Procedure Laterality Date   • KNEE ARTHROSCOPY     • TESTICLE SURGERY      Granuloma of the right testicle   • VASECTOMY       Social History:   Social History     Social History   • Marital status:      Spouse name: N/A   • Number of children: N/A   • Years of education: N/A     Occupational History   • Not on file.     Social History Main Topics   • Smoking status: Former Smoker   • Smokeless tobacco: Not on file   • Alcohol use Yes   • Drug use: Unknown   • Sexual activity: Not on file     Other Topics Concern   • Not on file     Social History Narrative   • No narrative on file     Family History:   Family History   Problem Relation Age of Onset   • Mental illness Paternal Uncle        Review of Systems   Constitutional: Negative.    HENT: Negative.    Eyes: Negative.    Respiratory: Negative.    Cardiovascular: Negative.    Gastrointestinal: Negative.    Endocrine: Negative.    Genitourinary: Negative.    Musculoskeletal: Negative.    Skin: Negative.    Allergic/Immunologic: Negative.    Neurological: Negative.    Hematological: Negative.    Psychiatric/Behavioral: Negative.         Performance Status:  Asymptomatic    Medications:    Current Outpatient Prescriptions   Medication Sig Dispense Refill   • carisoprodol (SOMA) 350 MG tablet      • DYMISTA 137-50 MCG/ACT suspension      • ezetimibe (ZETIA) 10 MG  "tablet Take 10 mg by mouth Daily.     • FLUCELVAX QUADRIVALENT 0.5 ML suspension prefilled syringe injection TO BE ADMINISTERED BY PHARMACIST FOR IMMUNIZATION  0   • levocetirizine (XYZAL) 5 MG tablet Take 5 mg by mouth.     • LIVALO 4 MG tablet      • meloxicam (MOBIC) 15 MG tablet Take 15 mg by mouth Daily.     • montelukast (SINGULAIR) 4 MG chewable tablet Chew 4 mg.     • vitamin B-12 (CYANOCOBALAMIN) 500 MCG tablet Take 500 mcg by mouth Daily.     • vitamin D (ERGOCALCIFEROL) 60254 units capsule capsule      • zolpidem CR (AMBIEN CR) 12.5 MG CR tablet Take 12.5 mg by mouth.       No current facility-administered medications for this visit.        ALLERGIES:    Allergies   Allergen Reactions   • Sulfa Antibiotics        Objective      Vitals:    04/27/18 0846   BP: 136/74   Pulse: 93   Resp: 18   Temp: 97.5 °F (36.4 °C)   TempSrc: Tympanic   SpO2: 97%   Weight: 102 kg (224 lb)   Height: 172.7 cm (68\")   PainSc: 0-No pain         Current Status 4/27/2018   ECOG score 0         Physical Exam    General Appearance: Patient is awake, alert, oriented and in no acute distress. Patient is welldeveloped, wellnourished, and appears stated age.  HEENT: Normocephalic. Sclerae clear, conjunctiva pink, extraocular movements intact, pupils, round, reactive to light and  accommodation. Mouth and throat are clear with moist oral mucosa.  NECK: Supple, no jugular venous distention, thyroid not enlarged.  LYMPH: No cervical, supraclavicular, axillary, or inguinal lymphadenopathy.  CHEST: Equal bilateral expansion, AP  diameter normal, resonant percussion note  LUNGS: Good air movement, no rales, rhonchi, rubs or wheezes with auscultation  CARDIO: Regular sinus rhythm, no murmurs, gallops or rubs.  ABDOMEN: Nondistended, soft, No tenderness, no guarding, no rebound, No hepatosplenomegaly. No abdominal masses. Bowel sounds positive. No hernia  GENITALIA: Not examined.  BREASTS: Not examined.  MUSKEL: No joint swelling, decreased " motion, or inflammation  EXTREMS: No edema, clubbing, cyanosis, No varicose veins.  NEURO: Grossly nonfocal, Gait is coordinated and smooth, Cognition is preserved.  SKIN: No rashes, no ecchymoses, no petechia.  PSYCH: Oriented to time, place and person. Memory is preserved. Mood and affect appear normal  RECENT LABS:  No visits with results within 7 Day(s) from this visit.   Latest known visit with results is:   Lab on 10/20/2017   Component Date Value Ref Range Status   • Glucose 10/20/2017 98  70 - 100 mg/dL Final   • BUN 10/20/2017 18  5 - 21 mg/dL Final   • Creatinine 10/20/2017 0.85  0.50 - 1.40 mg/dL Final   • Sodium 10/20/2017 140  135 - 145 mmol/L Final   • Potassium 10/20/2017 4.0  3.5 - 5.3 mmol/L Final   • Chloride 10/20/2017 108  98 - 110 mmol/L Final   • CO2 10/20/2017 23.0* 24.0 - 31.0 mmol/L Final   • Calcium 10/20/2017 9.8  8.4 - 10.4 mg/dL Final   • Total Protein 10/20/2017 6.9  6.3 - 8.7 g/dL Final   • Albumin 10/20/2017 4.20  3.50 - 5.00 g/dL Final   • ALT (SGPT) 10/20/2017 51  0 - 54 U/L Final   • AST (SGOT) 10/20/2017 27  7 - 45 U/L Final   • Alkaline Phosphatase 10/20/2017 47  24 - 120 U/L Final   • Total Bilirubin 10/20/2017 0.4  0.1 - 1.0 mg/dL Final   • eGFR Non  Amer 10/20/2017 98  >60 mL/min/1.73 Final   • Globulin 10/20/2017 2.7  gm/dL Final   • A/G Ratio 10/20/2017 1.6  1.1 - 2.5 g/dL Final   • BUN/Creatinine Ratio 10/20/2017 21.2    Final   • Anion Gap 10/20/2017 9.0  4.0 - 13.0 mmol/L Final   • LDH 10/20/2017 346  265 - 665 U/L Final   • AFP Tumor Marker 10/21/2017 2.8  0.0 - 8.3 ng/mL Final   • HCG Quantitative 10/21/2017 <1  0 - 3 mIU/mL Final   • WBC 10/20/2017 4.00* 4.80 - 10.80 10*3/mm3 Final   • RBC 10/20/2017 4.90  4.20 - 5.40 10*6/mm3 Final   • Hemoglobin 10/20/2017 15.1  14.0 - 18.0 g/dL Final   • Hematocrit 10/20/2017 43.2  40.0 - 52.0 % Final   • MCV 10/20/2017 88.2  82.0 - 95.0 fL Final   • MCH 10/20/2017 30.8  28.0 - 32.0 pg Final   • MCHC 10/20/2017 35.0  33.0 -  36.0 g/dL Final   • RDW 10/20/2017 12.9  12.0 - 15.0 % Final   • MPV 10/20/2017 8.8  6.0 - 12.0 fL Final   • Platelets 10/20/2017 204  130 - 400 10*3/mm3 Final   • Neutrophil % 10/20/2017 58.2  39.0 - 78.0 % Final   • Lymphocyte % 10/20/2017 28.1  15.0 - 45.0 % Final   • Auto Mixed Cells % 10/20/2017 13.7  0.1 - 24.0 % Final   • Neutrophils, Absolute 10/20/2017 2.40  1.50 - 8.30 10*3/mm3 Final   • Lymphocytes, Absolute 10/20/2017 1.10  0.80 - 7.00 10*3/mm3 Final   • Auto Mixed Cells # 10/20/2017 0.50  0.10 - 2.60 10*3/mm3 Final       RADIOLOGY:  No results found.         Assessment/Plan  Alex Vann is a 42 y.o. year old male non seminoma GCT, s/p BEP x3, s/p resection f teratoma 2012, who is currently without any evidence of disease.    Patient Active Problem List   Diagnosis   • Malignant neoplasm of right testis   • Teratoma          1.  Last CT chest abdomen and pelvis done on 04/18/2017 shows no evidence of disease recurrence.  I reviewed his lab with him today, alpha-fetoprotein of 2.8, beta hCG less than 1, .  I also reviewed surveillance schedule with patient, he is I5 years out and per NCCN guidelines, will obtain CT abdomen and pelvis only if clinical indicated, will continue yearly chest x-ray.   -- reviewed labs done at PCP. Markers nl, cbc, cmp unremarkable.  --cxr today  -- rtc in 12months    2. Insomnia: on Ambien  3. Hypertryglyceride: on zetia  4. Athritis: on meloxicam        Richar Hope MD    4/27/2018    10:12 AM

## 2018-05-08 ENCOUNTER — HOSPITAL ENCOUNTER (OUTPATIENT)
Dept: GENERAL RADIOLOGY | Facility: HOSPITAL | Age: 43
Discharge: HOME | End: 2018-05-08
Attending: INTERNAL MEDICINE | Admitting: INTERNAL MEDICINE

## 2018-05-08 DIAGNOSIS — C62.90 MALIGNANT NEOPLASM OF TESTIS, UNSPECIFIED LATERALITY, UNSPECIFIED WHETHER DESCENDED OR UNDESCENDED: ICD-10-CM

## 2018-05-08 PROCEDURE — 71046 X-RAY EXAM CHEST 2 VIEWS: CPT

## 2019-04-26 ENCOUNTER — APPOINTMENT (OUTPATIENT)
Dept: LAB | Facility: HOSPITAL | Age: 44
End: 2019-04-26

## 2019-04-26 ENCOUNTER — OFFICE VISIT (OUTPATIENT)
Dept: ONCOLOGY | Facility: CLINIC | Age: 44
End: 2019-04-26

## 2019-04-26 VITALS
BODY MASS INDEX: 35.12 KG/M2 | DIASTOLIC BLOOD PRESSURE: 84 MMHG | OXYGEN SATURATION: 94 % | TEMPERATURE: 98 F | RESPIRATION RATE: 16 BRPM | HEIGHT: 68 IN | SYSTOLIC BLOOD PRESSURE: 126 MMHG | HEART RATE: 72 BPM | WEIGHT: 231.7 LBS

## 2019-04-26 DIAGNOSIS — C62.91 MALIGNANT NEOPLASM OF RIGHT TESTIS, UNSPECIFIED WHETHER DESCENDED OR UNDESCENDED (HCC): Primary | ICD-10-CM

## 2019-04-26 DIAGNOSIS — C62.11 MALIGNANT NEOPLASM OF DESCENDED RIGHT TESTIS (HCC): Primary | ICD-10-CM

## 2019-04-26 LAB
ALBUMIN SERPL-MCNC: 4.5 G/DL (ref 3.5–5)
ALBUMIN/GLOB SERPL: 1.6 G/DL (ref 1.1–2.5)
ALP SERPL-CCNC: 54 U/L (ref 24–120)
ALT SERPL W P-5'-P-CCNC: 44 U/L (ref 0–54)
ANION GAP SERPL CALCULATED.3IONS-SCNC: 12 MMOL/L (ref 4–13)
AST SERPL-CCNC: 34 U/L (ref 7–45)
BASOPHILS # BLD AUTO: 0.04 10*3/MM3 (ref 0–0.2)
BASOPHILS NFR BLD AUTO: 0.9 % (ref 0–2)
BILIRUB SERPL-MCNC: 0.5 MG/DL (ref 0.1–1)
BUN BLD-MCNC: 17 MG/DL (ref 5–21)
BUN/CREAT SERPL: 20 (ref 7–25)
CALCIUM SPEC-SCNC: 9.8 MG/DL (ref 8.4–10.4)
CHLORIDE SERPL-SCNC: 106 MMOL/L (ref 98–110)
CO2 SERPL-SCNC: 24 MMOL/L (ref 24–31)
CREAT BLD-MCNC: 0.85 MG/DL (ref 0.5–1.4)
DEPRECATED RDW RBC AUTO: 39.9 FL (ref 40–54)
EOSINOPHIL # BLD AUTO: 0.21 10*3/MM3 (ref 0–0.7)
EOSINOPHIL NFR BLD AUTO: 4.6 % (ref 0–4)
ERYTHROCYTE [DISTWIDTH] IN BLOOD BY AUTOMATED COUNT: 12.5 % (ref 12–15)
GFR SERPL CREATININE-BSD FRML MDRD: 98 ML/MIN/1.73
GLOBULIN UR ELPH-MCNC: 2.8 GM/DL
GLUCOSE BLD-MCNC: 93 MG/DL (ref 70–100)
HCT VFR BLD AUTO: 45 % (ref 40–52)
HGB BLD-MCNC: 15.5 G/DL (ref 14–18)
IMM GRANULOCYTES # BLD AUTO: 0.02 10*3/MM3 (ref 0–0.05)
IMM GRANULOCYTES NFR BLD AUTO: 0.4 % (ref 0–5)
LDH SERPL-CCNC: 329 U/L (ref 265–665)
LYMPHOCYTES # BLD AUTO: 1.33 10*3/MM3 (ref 0.72–4.86)
LYMPHOCYTES NFR BLD AUTO: 28.9 % (ref 15–45)
MCH RBC QN AUTO: 29.9 PG (ref 28–32)
MCHC RBC AUTO-ENTMCNC: 34.4 G/DL (ref 33–36)
MCV RBC AUTO: 86.9 FL (ref 82–95)
MONOCYTES # BLD AUTO: 0.51 10*3/MM3 (ref 0.19–1.3)
MONOCYTES NFR BLD AUTO: 11.1 % (ref 4–12)
NEUTROPHILS # BLD AUTO: 2.49 10*3/MM3 (ref 1.87–8.4)
NEUTROPHILS NFR BLD AUTO: 54.1 % (ref 39–78)
NRBC BLD AUTO-RTO: 0 /100 WBC (ref 0–0.2)
PLATELET # BLD AUTO: 228 10*3/MM3 (ref 130–400)
PMV BLD AUTO: 9.3 FL (ref 6–12)
POTASSIUM BLD-SCNC: 4.2 MMOL/L (ref 3.5–5.3)
PROT SERPL-MCNC: 7.3 G/DL (ref 6.3–8.7)
RBC # BLD AUTO: 5.18 10*6/MM3 (ref 4.8–5.9)
SODIUM BLD-SCNC: 142 MMOL/L (ref 135–145)
WBC NRBC COR # BLD: 4.6 10*3/MM3 (ref 4.8–10.8)

## 2019-04-26 PROCEDURE — 84702 CHORIONIC GONADOTROPIN TEST: CPT | Performed by: INTERNAL MEDICINE

## 2019-04-26 PROCEDURE — 99213 OFFICE O/P EST LOW 20 MIN: CPT | Performed by: INTERNAL MEDICINE

## 2019-04-26 PROCEDURE — 82105 ALPHA-FETOPROTEIN SERUM: CPT | Performed by: INTERNAL MEDICINE

## 2019-04-26 PROCEDURE — 83615 LACTATE (LD) (LDH) ENZYME: CPT | Performed by: INTERNAL MEDICINE

## 2019-04-26 PROCEDURE — 85025 COMPLETE CBC W/AUTO DIFF WBC: CPT | Performed by: INTERNAL MEDICINE

## 2019-04-26 PROCEDURE — 36415 COLL VENOUS BLD VENIPUNCTURE: CPT | Performed by: INTERNAL MEDICINE

## 2019-04-26 PROCEDURE — 80053 COMPREHEN METABOLIC PANEL: CPT | Performed by: INTERNAL MEDICINE

## 2019-04-26 NOTE — PROGRESS NOTES
[facility]  HEMATOLOGY & ONCOLOGY    Cancer Staging Information:  No matching staging information was found for the patient.      Subjective     VISIT DIAGNOSIS:   Encounter Diagnosis   Name Primary?   • Malignant neoplasm of descended right testis (CMS/HCC) Yes       REASON FOR VISIT:     Chief Complaint   Patient presents with   • Testis Cancer     Here for follow up        HEMATOLOGY / ONCOLOGY HISTORY:   Oncology/Hematology History    Mr. Vann is a 39 year old male who initially sought urologic attention because of a vasectomy. The patient underwent surgery  on7/22/2008 without complications. He was seen again because of a scrotal mass on 8/14/2008. He was examined by Dr. Guadalupe at  thattime and was felt to have a sperm granuloma status post vasectomy and this was not felt to be of any consequence. This was a mass  1cm nodule above his right testicle.The patient returned to see Dr. Mack June 21, 2011, with the finding of a right scrotal mass sudden in  onset. The mass was identified approximately two weeks prior to visit. It had been increasing in size with no associated pain. The mass  was identified during a testicularself exam. Dr. Mack examined the right testicle revealing a mildly tender, firm, marble sized mass lower  aspect of the testicle.  Ultrasound examination was performed finding a 2.8 cm heterogeneous mass in the right testicle. There were some internal cysticchanges  consistent with necrosis. This was worrisome for testicular carcinoma. There is diffuse microlithiasis involving the right testes.The left  testes shows diffuse microlithiasis but less severe on the right. No lesion was appreciated. There was an epidermal cystinvolving the right  testes measuring 5 mm and has a benign appearance and a varicocele on the left testes noted as well.  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 Tumor was a maximum of 3.7 cm in  diameter, no lymphovascular invasion is noted. The margins were clear and spermatic cord was unrevealing. The patient has undergonea  CT scan of the chest which is normal. A CT abdomen and pelvis which is reported to show a 3 cm left hepatic dome compatible with a  hemangioma, but ultrasound correlation recommended. . Preoperative laboratory studies revealed a normal CBC, a chemistry profilewithin  normal range. I do not see a LDH performed. His beta HCG was elevated at 8 with a normal being 03. His alpha fetoprotein [id number] with  normal being 0 to 8.Patient underwent a retroperitoneal lymph node dissection on August 16, 2011. Results of this surgery showed  negative nodes. Stage I  disease. On 10/10/2011 his alphafetoproteinwas  3.6 and beta hCG was undetectable. His LDH was normal.On 12/16/2011 his alphafetoproteinwas  3.5 and a beta hCG was 194. His beta hCG was repeated in our office was 220. Outpatient CAT  scans were performed on 12/30/2011. The chest has been unremarkable. CT scan of the abdomen and pelvis revealed small lymphnodes  in the retroperitoneum and a 2.5 cm mass effect. Question of seroma versus lymph node involvement was raised. In this setting arising  markers within 4 months of his retroperitoneal lymph node dissection and the fact that these markers were negative and 2 monthsafter his  dissection is consistent with recurrence of his disease and the need for chemotherapy treatment.  The patient received 3 cycles of BEP therapy from 1/16/2012 to 3/12/2012. A repeat CAT scan of his abdomen and pelvis on  3/19/2014post his 3 cycles of chemotherapy showed a mass lesion in his retroperitoneum. He return to [facility]  and underwent a retroperitoneal dissection of this mass by Dr. Paniagua, removing a benign teratoma on May 3, 2012. He  is  undergoingsurveillance for recurrence at this time.          Malignant neoplasm of right testis (CMS/HCC)    10/18/2016 Initial Diagnosis     Malignant neoplasm of right testis                INTERVAL HISTORY  Patient ID: Alex Vann is a 43 y.o. year old male NSGCT here for follow-up.  Denies any new abnormality. Denies sob, cp, coughing, ANASTASIYA, weight loss, night sweats. The rest of ros unremarkable.  Physical exam remains the same.    Past Medical History:   Past Medical History:   Diagnosis Date   • Hyperlipidemia    • Malignant neoplasm of right testis (CMS/HCC) 10/18/2016   • Osteoarthritis    • Testicular mass      Past Surgical History:   Past Surgical History:   Procedure Laterality Date   • KNEE ARTHROSCOPY     • TESTICLE SURGERY      Granuloma of the right testicle   • VASECTOMY       Social History:   Social History     Socioeconomic History   • Marital status:      Spouse name: Not on file   • Number of children: Not on file   • Years of education: Not on file   • Highest education level: Not on file   Tobacco Use   • Smoking status: Former Smoker   Substance and Sexual Activity   • Alcohol use: Yes     Family History:   Family History   Problem Relation Age of Onset   • Mental illness Paternal Uncle        Review of Systems   Constitutional: Negative.    HENT: Negative.    Eyes: Negative.    Respiratory: Negative.    Cardiovascular: Negative.    Gastrointestinal: Negative.    Endocrine: Negative.    Genitourinary: Negative.    Musculoskeletal: Negative.    Skin: Negative.    Allergic/Immunologic: Negative.    Neurological: Negative.    Hematological: Negative.    Psychiatric/Behavioral: Negative.         Performance Status:  Asymptomatic    Medications:    Current Outpatient Medications   Medication Sig Dispense Refill   • carisoprodol (SOMA) 350 MG tablet      • DYMISTA 137-50 MCG/ACT suspension      • levocetirizine (XYZAL) 5 MG tablet Take 5 mg by mouth.     • LIVALO 4 MG tablet    "   • meloxicam (MOBIC) 15 MG tablet Take 15 mg by mouth Daily.     • montelukast (SINGULAIR) 4 MG chewable tablet Chew 4 mg.     • vitamin B-12 (CYANOCOBALAMIN) 500 MCG tablet Take 500 mcg by mouth Daily.     • vitamin D (ERGOCALCIFEROL) 63272 units capsule capsule      • zolpidem CR (AMBIEN CR) 12.5 MG CR tablet Take 12.5 mg by mouth.       No current facility-administered medications for this visit.        ALLERGIES:    Allergies   Allergen Reactions   • Sulfa Antibiotics Rash       Objective      Vitals:    04/26/19 0835   BP: 126/84   Pulse: 72   Resp: 16   Temp: 98 °F (36.7 °C)   TempSrc: Oral   SpO2: 94%   Weight: 105 kg (231 lb 11.2 oz)   Height: 172.7 cm (68\")   PainSc: 0-No pain         Current Status 4/27/2018   ECOG score 0         Physical Examremains the same.  General Appearance: Patient is awake, alert, oriented and in no acute distress. Patient is welldeveloped, wellnourished, and appears stated age.  HEENT: Normocephalic. Sclerae clear, conjunctiva pink, extraocular movements intact, pupils, round, reactive to light and  accommodation. Mouth and throat are clear with moist oral mucosa.  NECK: Supple, no jugular venous distention, thyroid not enlarged.  LYMPH: No cervical, supraclavicular, axillary, or inguinal lymphadenopathy.  CHEST: Equal bilateral expansion, AP  diameter normal, resonant percussion note  LUNGS: Good air movement, no rales, rhonchi, rubs or wheezes with auscultation  CARDIO: Regular sinus rhythm, no murmurs, gallops or rubs.  ABDOMEN: Nondistended, soft, No tenderness, no guarding, no rebound, No hepatosplenomegaly. No abdominal masses. Bowel sounds positive. No hernia  GENITALIA: Not examined.  BREASTS: Not examined.  MUSKEL: No joint swelling, decreased motion, or inflammation  EXTREMS: No edema, clubbing, cyanosis, No varicose veins.  NEURO: Grossly nonfocal, Gait is coordinated and smooth, Cognition is preserved.  SKIN: No rashes, no ecchymoses, no petechia.  PSYCH: Oriented " to time, place and person. Memory is preserved. Mood and affect appear normal  RECENT LABS:  No visits with results within 7 Day(s) from this visit.   Latest known visit with results is:   Lab on 10/20/2017   Component Date Value Ref Range Status   • Glucose 10/20/2017 98  70 - 100 mg/dL Final   • BUN 10/20/2017 18  5 - 21 mg/dL Final   • Creatinine 10/20/2017 0.85  0.50 - 1.40 mg/dL Final   • Sodium 10/20/2017 140  135 - 145 mmol/L Final   • Potassium 10/20/2017 4.0  3.5 - 5.3 mmol/L Final   • Chloride 10/20/2017 108  98 - 110 mmol/L Final   • CO2 10/20/2017 23.0* 24.0 - 31.0 mmol/L Final   • Calcium 10/20/2017 9.8  8.4 - 10.4 mg/dL Final   • Total Protein 10/20/2017 6.9  6.3 - 8.7 g/dL Final   • Albumin 10/20/2017 4.20  3.50 - 5.00 g/dL Final   • ALT (SGPT) 10/20/2017 51  0 - 54 U/L Final   • AST (SGOT) 10/20/2017 27  7 - 45 U/L Final   • Alkaline Phosphatase 10/20/2017 47  24 - 120 U/L Final   • Total Bilirubin 10/20/2017 0.4  0.1 - 1.0 mg/dL Final   • eGFR Non  Amer 10/20/2017 98  >60 mL/min/1.73 Final   • Globulin 10/20/2017 2.7  gm/dL Final   • A/G Ratio 10/20/2017 1.6  1.1 - 2.5 g/dL Final   • BUN/Creatinine Ratio 10/20/2017 21.2    Final   • Anion Gap 10/20/2017 9.0  4.0 - 13.0 mmol/L Final   • LDH 10/20/2017 346  265 - 665 U/L Final   • AFP Tumor Marker 10/20/2017 2.8  0.0 - 8.3 ng/mL Final    Roche ECLIA methodology   • HCG Quantitative 10/20/2017 <1  0 - 3 mIU/mL Final    Roche ECLIA methodology   • WBC 10/20/2017 4.00* 4.80 - 10.80 10*3/mm3 Final   • RBC 10/20/2017 4.90  4.20 - 5.40 10*6/mm3 Final   • Hemoglobin 10/20/2017 15.1  14.0 - 18.0 g/dL Final   • Hematocrit 10/20/2017 43.2  40.0 - 52.0 % Final   • MCV 10/20/2017 88.2  82.0 - 95.0 fL Final   • MCH 10/20/2017 30.8  28.0 - 32.0 pg Final   • MCHC 10/20/2017 35.0  33.0 - 36.0 g/dL Final   • RDW 10/20/2017 12.9  12.0 - 15.0 % Final   • MPV 10/20/2017 8.8  6.0 - 12.0 fL Final   • Platelets 10/20/2017 204  130 - 400 10*3/mm3 Final   • Neutrophil  % 10/20/2017 58.2  39.0 - 78.0 % Final   • Lymphocyte % 10/20/2017 28.1  15.0 - 45.0 % Final   • Auto Mixed Cells % 10/20/2017 13.7  0.1 - 24.0 % Final   • Neutrophils, Absolute 10/20/2017 2.40  1.50 - 8.30 10*3/mm3 Final   • Lymphocytes, Absolute 10/20/2017 1.10  0.80 - 7.00 10*3/mm3 Final   • Auto Mixed Cells # 10/20/2017 0.50  0.10 - 2.60 10*3/mm3 Final       RADIOLOGY:  No results found.         Assessment/Plan  Alex Vann is a 43 y.o. year old male non seminoma GCT, s/p BEP x3, s/p resection f teratoma 2012, who is currently without any evidence of disease.    Patient Active Problem List   Diagnosis   • Malignant neoplasm of right testis (CMS/HCC)   • Teratoma          1.  Last CT chest abdomen and pelvis done on 04/18/2017 shows no evidence of disease recurrence.  I reviewed his lab with him today, alpha-fetoprotein of 2.8, beta hCG less than 1, .  I also reviewed surveillance schedule with patient, he is 5 years out and per NCCN guidelines, will obtain CT abdomen and pelvis only if clinical indicated, will continue yearly chest x-ray.   -- reviewed labs today LDH nl 329, wbc 4.60, hg 15.5, plt 228. cmp unremarkable. . Markers pending .  --cxr today  -- rtc in 12months with cxr.    2. Insomnia: on Ambien  3. Hypertryglyceride: on zetia  4. Athritis: on meloxicam        Richar Hope MD    4/26/2019    8:45 AM

## 2019-04-27 LAB
AFP-TM SERPL-MCNC: 2.4 NG/ML (ref 0–8.3)
HCG INTACT+B SERPL-ACNC: <1 MIU/ML (ref 0–3)

## 2019-04-29 ENCOUNTER — TELEPHONE (OUTPATIENT)
Dept: ONCOLOGY | Facility: CLINIC | Age: 44
End: 2019-04-29

## 2019-04-29 NOTE — TELEPHONE ENCOUNTER
----- Message from Richar Hope MD sent at 4/29/2019  8:26 AM CDT -----  Please call the patient markers normal

## 2019-04-29 NOTE — TELEPHONE ENCOUNTER
Per Dr Hope instructions patient was called and given values of his recent lab work along with Tumor Marker results, pt informed that they were normal and WNL, patient v/u and encouraged to call if he has questions or concerns prior to his next elly apt. He v/u.

## 2019-05-01 ENCOUNTER — TELEPHONE (OUTPATIENT)
Dept: ONCOLOGY | Facility: CLINIC | Age: 44
End: 2019-05-01

## 2019-05-07 ENCOUNTER — HOSPITAL ENCOUNTER (OUTPATIENT)
Dept: GENERAL RADIOLOGY | Facility: HOSPITAL | Age: 44
Discharge: HOME | End: 2019-05-07
Admitting: INTERNAL MEDICINE

## 2019-05-07 DIAGNOSIS — C62.11 MALIGNANT NEOPLASM OF DESCENDED RIGHT TESTIS (HCC): ICD-10-CM

## 2019-05-07 PROCEDURE — 71046 X-RAY EXAM CHEST 2 VIEWS: CPT

## 2020-04-24 ENCOUNTER — APPOINTMENT (OUTPATIENT)
Dept: LAB | Facility: HOSPITAL | Age: 45
End: 2020-04-24

## 2020-07-07 ENCOUNTER — HOSPITAL ENCOUNTER (OUTPATIENT)
Dept: GENERAL RADIOLOGY | Facility: HOSPITAL | Age: 45
Discharge: HOME | End: 2020-07-07
Admitting: INTERNAL MEDICINE

## 2020-07-07 ENCOUNTER — TELEPHONE (OUTPATIENT)
Dept: ONCOLOGY | Facility: CLINIC | Age: 45
End: 2020-07-07

## 2020-07-07 ENCOUNTER — OFFICE VISIT (OUTPATIENT)
Dept: ONCOLOGY | Facility: CLINIC | Age: 45
End: 2020-07-07

## 2020-07-07 VITALS
TEMPERATURE: 97.4 F | HEIGHT: 68 IN | RESPIRATION RATE: 16 BRPM | WEIGHT: 229.2 LBS | DIASTOLIC BLOOD PRESSURE: 82 MMHG | BODY MASS INDEX: 34.74 KG/M2 | SYSTOLIC BLOOD PRESSURE: 148 MMHG | OXYGEN SATURATION: 97 % | HEART RATE: 80 BPM

## 2020-07-07 DIAGNOSIS — C62.91 MALIGNANT NEOPLASM OF RIGHT TESTIS, UNSPECIFIED WHETHER DESCENDED OR UNDESCENDED (HCC): ICD-10-CM

## 2020-07-07 DIAGNOSIS — C62.91 MALIGNANT NEOPLASM OF RIGHT TESTIS, UNSPECIFIED WHETHER DESCENDED OR UNDESCENDED (HCC): Primary | ICD-10-CM

## 2020-07-07 PROCEDURE — 99214 OFFICE O/P EST MOD 30 MIN: CPT | Performed by: INTERNAL MEDICINE

## 2020-07-07 PROCEDURE — 71046 X-RAY EXAM CHEST 2 VIEWS: CPT

## 2020-07-07 NOTE — TELEPHONE ENCOUNTER
notified pt he v/u    ----- Message from Richar Hope MD sent at 7/7/2020 11:05 AM CDT -----  Please contact patient with result. Normal

## 2020-07-07 NOTE — PROGRESS NOTES
[facility]  HEMATOLOGY & ONCOLOGY    Cancer Staging Information:  No matching staging information was found for the patient.      Subjective     VISIT DIAGNOSIS:   No diagnosis found.    REASON FOR VISIT:     Chief Complaint   Patient presents with   • Testis Cancer     Here for f/u        HEMATOLOGY / ONCOLOGY HISTORY:   Oncology/Hematology History    Mr. Vann is a 39 year old male who initially sought urologic attention because of a vasectomy. The patient underwent surgery  on7/22/2008 without complications. He was seen again because of a scrotal mass on 8/14/2008. He was examined by Dr. Guadalupe at  thattime and was felt to have a sperm granuloma status post vasectomy and this was not felt to be of any consequence. This was a mass  1cm nodule above his right testicle.The patient returned to see Dr. Mack June 21, 2011, with the finding of a right scrotal mass sudden in  onset. The mass was identified approximately two weeks prior to visit. It had been increasing in size with no associated pain. The mass  was identified during a testicularself exam. Dr. Mack examined the right testicle revealing a mildly tender, firm, marble sized mass lower  aspect of the testicle.  Ultrasound examination was performed finding a 2.8 cm heterogeneous mass in the right testicle. There were some internal cysticchanges  consistent with necrosis. This was worrisome for testicular carcinoma. There is diffuse microlithiasis involving the right testes.The left  testes shows diffuse microlithiasis but less severe on the right. No lesion was appreciated. There was an epidermal cystinvolving the right  testes measuring 5 mm and has a benign appearance and a varicocele on the left testes noted as well.  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 Tumor was a maximum of 3.7 cm in  diameter, no lymphovascular invasion is noted. The margins were clear and spermatic cord was unrevealing. The patient has undergonea  CT scan of the chest which is normal. A CT abdomen and pelvis which is reported to show a 3 cm left hepatic dome compatible with a  hemangioma, but ultrasound correlation recommended. . Preoperative laboratory studies revealed a normal CBC, a chemistry profilewithin  normal range. I do not see a LDH performed. His beta HCG was elevated at 8 with a normal being 03. His alpha fetoprotein [id number] with  normal being 0 to 8.Patient underwent a retroperitoneal lymph node dissection on August 16, 2011. Results of this surgery showed  negative nodes. Stage I  disease. On 10/10/2011 his alphafetoproteinwas  3.6 and beta hCG was undetectable. His LDH was normal.On 12/16/2011 his alphafetoproteinwas  3.5 and a beta hCG was 194. His beta hCG was repeated in our office was 220. Outpatient CAT  scans were performed on 12/30/2011. The chest has been unremarkable. CT scan of the abdomen and pelvis revealed small lymphnodes  in the retroperitoneum and a 2.5 cm mass effect. Question of seroma versus lymph node involvement was raised. In this setting arising  markers within 4 months of his retroperitoneal lymph node dissection and the fact that these markers were negative and 2 monthsafter his  dissection is consistent with recurrence of his disease and the need for chemotherapy treatment.  The patient received 3 cycles of BEP therapy from 1/16/2012 to 3/12/2012. A repeat CAT scan of his abdomen and pelvis on  3/19/2014post his 3 cycles of chemotherapy showed a mass lesion in his retroperitoneum. He return to [facility]  and underwent a retroperitoneal dissection of this mass by Dr. Paniagua, removing a benign teratoma on May 3, 2012. He is  undergoingsurveillance for recurrence at this time.          Malignant neoplasm of right testis  (CMS/[facility])    10/18/2016 Initial Diagnosis     Malignant neoplasm of right testis             INTERVAL HISTORY  Patient ID: Alex Vann is a 44 y.o. year old male NSGCT here for follow-up.  Denies any new abnormality. Denies sob, cp, coughing, ANASTASIYA, weight loss, night sweats. The rest of ros unremarkable.  Physical exam remains the same.    Past Medical History:   Past Medical History:   Diagnosis Date   • Hyperlipidemia    • Malignant neoplasm of right testis (CMS/[facility]) 10/18/2016   • Osteoarthritis    • Testicular mass      Past Surgical History:   Past Surgical History:   Procedure Laterality Date   • KNEE ARTHROSCOPY     • TESTICLE SURGERY      Granuloma of the right testicle   • VASECTOMY       Social History:   Social History     Socioeconomic History   • Marital status:      Spouse name: Not on file   • Number of children: Not on file   • Years of education: Not on file   • Highest education level: Not on file   Tobacco Use   • Smoking status: Former Smoker   Substance and Sexual Activity   • Alcohol use: Yes     Family History:   Family History   Problem Relation Age of Onset   • Mental illness Paternal Uncle        Review of Systems   Constitutional: Negative.    HENT: Negative.    Eyes: Negative.    Respiratory: Negative.    Cardiovascular: Negative.    Gastrointestinal: Negative.    Endocrine: Negative.    Genitourinary: Negative.    Musculoskeletal: Negative.    Skin: Negative.    Allergic/Immunologic: Negative.    Neurological: Negative.    Hematological: Negative.    Psychiatric/Behavioral: Negative.         Performance Status:  Asymptomatic    Medications:    Current Outpatient Medications   Medication Sig Dispense Refill   • carisoprodol (SOMA) 350 MG tablet      • levocetirizine (XYZAL) 5 MG tablet Take 5 mg by mouth.     • LIVALO 4 MG tablet      • meloxicam (MOBIC) 15 MG tablet Take 15 mg by mouth Daily.     • montelukast (SINGULAIR) 4 MG chewable tablet Chew 4 mg.     • vitamin B-12  "(CYANOCOBALAMIN) 500 MCG tablet Take 500 mcg by mouth Daily.     • vitamin D (ERGOCALCIFEROL) 45177 units capsule capsule        No current facility-administered medications for this visit.        ALLERGIES:    Allergies   Allergen Reactions   • Sulfa Antibiotics Rash       Objective      Vitals:    07/07/20 0818   BP: 148/82   Pulse: 80   Resp: 16   Temp: 97.4 °F (36.3 °C)   TempSrc: Temporal   SpO2: 97%   Weight: 104 kg (229 lb 3.2 oz)   Height: 172.7 cm (68\")   PainSc: 0-No pain         Current Status 4/27/2018   ECOG score 0         Physical Examremains the same.  General Appearance: Patient is awake, alert, oriented and in no acute distress. Patient is welldeveloped, wellnourished, and appears stated age.  HEENT: Normocephalic. Sclerae clear, conjunctiva pink, extraocular movements intact, pupils, round, reactive to light and  accommodation. Mouth and throat are clear with moist oral mucosa.  NECK: Supple, no jugular venous distention, thyroid not enlarged.  LYMPH: No cervical, supraclavicular, axillary, or inguinal lymphadenopathy.  CHEST: Equal bilateral expansion, AP  diameter normal, resonant percussion note  LUNGS: Good air movement, no rales, rhonchi, rubs or wheezes with auscultation  CARDIO: Regular sinus rhythm, no murmurs, gallops or rubs.  ABDOMEN: Nondistended, soft, No tenderness, no guarding, no rebound, No hepatosplenomegaly. No abdominal masses. Bowel sounds positive. No hernia  GENITALIA: Not examined.  BREASTS: Not examined.  MUSKEL: No joint swelling, decreased motion, or inflammation  EXTREMS: No edema, clubbing, cyanosis, No varicose veins.  NEURO: Grossly nonfocal, Gait is coordinated and smooth, Cognition is preserved.  SKIN: No rashes, no ecchymoses, no petechia.  PSYCH: Oriented to time, place and person. Memory is preserved. Mood and affect appear normal  RECENT LABS:  No visits with results within 7 Day(s) from this visit.   Latest known visit with results is:   Orders Only on " 04/26/2019   Component Date Value Ref Range Status   • LDH 04/26/2019 329  265 - 665 U/L Final   • AFP Tumor Marker 04/26/2019 2.4  0.0 - 8.3 ng/mL Final    Roche [employer] Electrochemiluminescence Immunoassay (ECLIA)  Values obtained with different assay methods or kits cannot be  used interchangeably.  Results cannot be interpreted as absolute  evidence of the presence or absence of malignant disease.  This test is not interpretable in pregnant females.   • HCG Quantitative 04/26/2019 <1  0 - 3 mIU/mL Final    Roche ECLIA methodology   • Glucose 04/26/2019 93  70 - 100 mg/dL Final   • BUN 04/26/2019 17  5 - 21 mg/dL Final   • Creatinine 04/26/2019 0.85  0.50 - 1.40 mg/dL Final   • Sodium 04/26/2019 142  135 - 145 mmol/L Final   • Potassium 04/26/2019 4.2  3.5 - 5.3 mmol/L Final   • Chloride 04/26/2019 106  98 - 110 mmol/L Final   • CO2 04/26/2019 24.0  24.0 - 31.0 mmol/L Final   • Calcium 04/26/2019 9.8  8.4 - 10.4 mg/dL Final   • Total Protein 04/26/2019 7.3  6.3 - 8.7 g/dL Final   • Albumin 04/26/2019 4.50  3.50 - 5.00 g/dL Final   • ALT (SGPT) 04/26/2019 44  0 - 54 U/L Final   • AST (SGOT) 04/26/2019 34  7 - 45 U/L Final   • Alkaline Phosphatase 04/26/2019 54  24 - 120 U/L Final   • Total Bilirubin 04/26/2019 0.5  0.1 - 1.0 mg/dL Final   • eGFR Non African Amer 04/26/2019 98  >60 mL/min/1.73 Final   • Globulin 04/26/2019 2.8  gm/dL Final   • A/G Ratio 04/26/2019 1.6  1.1 - 2.5 g/dL Final   • BUN/Creatinine Ratio 04/26/2019 20.0  7.0 - 25.0 Final   • Anion Gap 04/26/2019 12.0  4.0 - 13.0 mmol/L Final   • WBC 04/26/2019 4.60* 4.80 - 10.80 10*3/mm3 Final   • RBC 04/26/2019 5.18  4.80 - 5.90 10*6/mm3 Final   • Hemoglobin 04/26/2019 15.5  14.0 - 18.0 g/dL Final   • Hematocrit 04/26/2019 45.0  40.0 - 52.0 % Final   • MCV 04/26/2019 86.9  82.0 - 95.0 fL Final   • MCH 04/26/2019 29.9  28.0 - 32.0 pg Final   • MCHC 04/26/2019 34.4  33.0 - 36.0 g/dL Final   • RDW 04/26/2019 12.5  12.0 - 15.0 % Final   • RDW-SD  04/26/2019 39.9* 40.0 - 54.0 fl Final   • MPV 04/26/2019 9.3  6.0 - 12.0 fL Final   • Platelets 04/26/2019 228  130 - 400 10*3/mm3 Final   • Neutrophil % 04/26/2019 54.1  39.0 - 78.0 % Final   • Lymphocyte % 04/26/2019 28.9  15.0 - 45.0 % Final   • Monocyte % 04/26/2019 11.1  4.0 - 12.0 % Final   • Eosinophil % 04/26/2019 4.6* 0.0 - 4.0 % Final   • Basophil % 04/26/2019 0.9  0.0 - 2.0 % Final   • Immature Grans % 04/26/2019 0.4  0.0 - 5.0 % Final   • Neutrophils, Absolute 04/26/2019 2.49  1.87 - 8.40 10*3/mm3 Final   • Lymphocytes, Absolute 04/26/2019 1.33  0.72 - 4.86 10*3/mm3 Final   • Monocytes, Absolute 04/26/2019 0.51  0.19 - 1.30 10*3/mm3 Final   • Eosinophils, Absolute 04/26/2019 0.21  0.00 - 0.70 10*3/mm3 Final   • Basophils, Absolute 04/26/2019 0.04  0.00 - 0.20 10*3/mm3 Final   • Immature Grans, Absolute 04/26/2019 0.02  0.00 - 0.05 10*3/mm3 Final   • nRBC 04/26/2019 0.0  0.0 - 0.2 /100 WBC Final       RADIOLOGY:  No results found.         Assessment/Plan  Alex Vann is a 44 y.o. year old male non seminoma GCT, s/p BEP x3, s/p resection f teratoma 2012, who is currently without any evidence of disease.    Patient Active Problem List   Diagnosis   • Malignant neoplasm of right testis (CMS/HCC)   • Teratoma          1.  Last CT chest abdomen and pelvis done on 04/18/2017 shows no evidence of disease recurrence.  I reviewed his lab with him today, alpha-fetoprotein of 2.8, beta hCG less than 1, .  I also reviewed surveillance schedule with patient, he is 5 years out and per NCCN guidelines, will obtain CT abdomen and pelvis only if clinical indicated, will continue yearly chest x-ray.   -- reviewed labs today tumor markers, LDH, cbc, cmp at OSH unremarkable  --cxr today  -- rtc in 12months with cxr.    2. Insomnia: on Ambien  3. Hypertryglyceride: on zetia  4. Athritis: on meloxicam        Richar Hope MD    7/7/2020    08:24

## 2020-10-09 ENCOUNTER — TELEPHONE (OUTPATIENT)
Dept: ONCOLOGY | Facility: CLINIC | Age: 45
End: 2020-10-09

## 2020-10-09 NOTE — TELEPHONE ENCOUNTER
Bhargavi calling from [employer].    Wants to confirm fax received that they sent yesterday.    [phone] ext [phone]

## 2020-11-24 ENCOUNTER — TRANSCRIBE ORDERS (OUTPATIENT)
Dept: ADMINISTRATIVE | Facility: HOSPITAL | Age: 45
End: 2020-11-24

## 2020-11-24 DIAGNOSIS — M54.10 RADICULAR LEG PAIN: Primary | ICD-10-CM

## 2020-11-27 ENCOUNTER — HOSPITAL ENCOUNTER (OUTPATIENT)
Dept: MRI IMAGING | Facility: HOSPITAL | Age: 45
Discharge: HOME | End: 2020-11-27
Admitting: FAMILY MEDICINE

## 2020-11-27 DIAGNOSIS — M54.10 RADICULAR LEG PAIN: ICD-10-CM

## 2020-11-27 PROCEDURE — 72148 MRI LUMBAR SPINE W/O DYE: CPT

## 2020-12-04 ENCOUNTER — TELEPHONE (OUTPATIENT)
Dept: NEUROSURGERY | Facility: CLINIC | Age: 45
End: 2020-12-04

## 2020-12-04 NOTE — TELEPHONE ENCOUNTER
Patient on wait list for sooner appointment. LVM with patient advising of openings for 12/07/20. Requested call back to further discuss and see if he would like to come in. My direct extension given for call back.

## 2020-12-07 ENCOUNTER — OFFICE VISIT (OUTPATIENT)
Dept: NEUROSURGERY | Facility: CLINIC | Age: 45
End: 2020-12-07

## 2020-12-07 VITALS — BODY MASS INDEX: 35.16 KG/M2 | HEIGHT: 68 IN | WEIGHT: 232 LBS

## 2020-12-07 DIAGNOSIS — M43.16 SPONDYLOLISTHESIS OF LUMBAR REGION: Primary | ICD-10-CM

## 2020-12-07 DIAGNOSIS — E66.9 OBESITY (BMI 30-39.9): ICD-10-CM

## 2020-12-07 DIAGNOSIS — Z87.891 FORMER TOBACCO USE: ICD-10-CM

## 2020-12-07 PROCEDURE — 99244 OFF/OP CNSLTJ NEW/EST MOD 40: CPT | Performed by: NEUROLOGICAL SURGERY

## 2020-12-07 RX ORDER — IRBESARTAN 150 MG/1
150 TABLET ORAL DAILY
COMMUNITY
Start: 2020-11-23

## 2020-12-11 ENCOUNTER — TREATMENT (OUTPATIENT)
Dept: PHYSICAL THERAPY | Facility: CLINIC | Age: 45
End: 2020-12-11

## 2020-12-11 DIAGNOSIS — M43.17 SPONDYLOLISTHESIS AT L5-S1 LEVEL: Primary | ICD-10-CM

## 2020-12-11 DIAGNOSIS — M54.50 LOW BACK PAIN RADIATING TO RIGHT LEG: ICD-10-CM

## 2020-12-11 DIAGNOSIS — M79.604 LOW BACK PAIN RADIATING TO RIGHT LEG: ICD-10-CM

## 2020-12-11 PROCEDURE — 97161 PT EVAL LOW COMPLEX 20 MIN: CPT | Performed by: PHYSICAL THERAPIST

## 2020-12-11 PROCEDURE — 97140 MANUAL THERAPY 1/> REGIONS: CPT | Performed by: PHYSICAL THERAPIST

## 2020-12-11 NOTE — PROGRESS NOTES
Physical Therapy Initial Evaluation and Plan of Care    Patient: Alex Vann   : 1975  Diagnosis/ICD-10 Code:  Spondylolisthesis at L5-S1 level [M43.17]  Referring practitioner: Fabrizio Yip, *  Date of Initial Visit: 2020  Today's Date: 2020  Patient seen for 1 sessions    Visit Diagnoses:    ICD-10-CM ICD-9-CM   1. Spondylolisthesis at L5-S1 level  M43.17 756.12   2. Low back pain radiating to right leg  M54.5 724.2          Subjective Questionnaire: Oswestry: 10%    Subjective Evaluation    History of Present Illness  Date of onset: 2015  Mechanism of injury: No specific etiology of pain.    Subjective comment: He has had radicular pain into his right leg for the last several years. It would flare up at varying times. Most of his day is sitting in an office chair. He denies any issues with b/b.   Patient Occupation: [employer]/ Quality of life: excellent    Pain  Current pain ratin  At best pain ratin  At worst pain ratin  Location: jaime lumbar and right lateral thigh to post calf  Quality: burning, cramping and radiating  Relieving factors: change in position (moving right leg)  Aggravating factors: prolonged positioning and ambulation (sitting, driving)  Progression: worsening    Social Support  Lives with: spouse and young children    Diagnostic Tests  MRI studies: abnormal (Gr 1 spondy L5/S1)    Treatments  Previous treatment: medication  Current treatment: home therapy  Current treatment comments: heat.     Patient Goals  Patient goals for therapy: decreased pain and return to sport/leisure activities         Objective          Static Posture     Shoulders  Rounded.    Thoracic Spine  Hyperkyphosis.    Lumbar Spine   Increased lordosis.     Pelvis   Anterior pelvic tilt    Postural Observations  Seated posture: fair  Standing posture: fair        Palpation   Left   No palpable tenderness to the iliopsoas.   Hypertonic in the  piriformis.     Right   No palpable tenderness to the iliopsoas and piriformis.     Tenderness     Lumbar Spine  Tenderness in the facet joint.     Right Hip   Tenderness in the greater trochanter.     Additional Tenderness Details  Tender jaime L5/S1    Neurological Testing     Sensation     Lumbar   Left   Diminished: light touch    Comments   Left light touch: right lateral thigh    Reflexes   Left   Patellar (L4): trace (1+)  Achilles (S1): trace (1+)    Right   Patellar (L4): normal (2+)  Achilles (S1): trace (1+)    Active Range of Motion     Lumbar   Flexion: Active lumbar flexion: c/o stiffness in lumbar. WFL and with pain  Extension: 20 degrees with pain    Additional Active Range of Motion Details  Hinges into extension with back bending.     Strength/Myotome Testing     Left Hip   Planes of Motion   Flexion: WFL  Extension: 4  Abduction: 4+    Right Hip   Planes of Motion   Extension: 4-  Abduction: 4-    Left Knee   Flexion: WFL  Extension: WFL    Right Knee   Flexion: WFL  Extension: WFL    Left Ankle/Foot   Dorsiflexion: WFL.   Plantar flexion: WFL.   Great toe extension: WFL.     Right Ankle/Foot   Dorsiflexion: WFL.   Plantar flexion: WFL.   Great toe extension: WFL.     Additional Strength Details  Abdominal MMT 3/5    Muscle Activation   Patient able to activate left transverse abdominals and right transverse abdominals.     Tests     Lumbar     Right   Positive passive SLR.   Negative valsalva.     Right Pelvic Girdle/Sacrum   Negative: sacrum compression, sacral spring and thigh thrust.     Lumbar Flexibility Comments:   Tightness jaime piriformis, right worse than left      Manual Therapy     [id number]  Comments   sidelying right piri deep tissue release    sidelying IASTM right ITB                Timed Minutes 10     Assessment & Plan     Assessment  Impairments: abnormal muscle firing, abnormal or restricted ROM, impaired physical strength and pain with function  Assessment details: His primary pain  appears to be from his right L5/S1 stenosis from his spondy but he also may have a double crush syndrome from the tightness through his right piriformis. His right lateral hip pain appears to be more from an ITB friction syndrome from the weakness of his right hip. He is tight in a swayback posture and he has aa weak core after multiple abdominal surgeries. I think he can progress well with skilled PT.  Prognosis: good  Functional Limitations: sleeping, walking and sitting  Plan  Therapy options: will be seen for skilled physical therapy services  Planned modality interventions: dry needling, low level laser therapy, TENS and traction  Planned therapy interventions: abdominal trunk stabilization, body mechanics training, flexibility, home exercise program, joint mobilization, manual therapy, neuromuscular re-education, postural training, spinal/joint mobilization, strengthening, stretching and therapeutic activities  Frequency: 2x week  Duration in visits: 12  Treatment plan discussed with: patient  Plan details: Focus early on mobility and flexibility through his spine and hips. Progress with postural/core/hip stability to improve postural alignment and mechanics. Pain modalities may be used as needed. Progress his HEP for the same.      Goals   STG by: 3 weeks Comments Status   1. Improve jaime thoracolumbar mobility       2. Improve muscle relaxation of piriformis       3. Improve jaime hip passive ER to WNL deg               LTG by: 6 weeks       Reports no radicular symptoms in right leg for a week.       Able to drive without exacerbation of pain       SLS either leg x 10 secs on right with stabiliity       Reports pain no greater than 1-2/10 when it does occur.       Improve Modified Oswestry to 5 or less     Independent with HEP for flexibility and core/hip stability.         Alex was educated on piri stretch; kneeling thoracicext stretch/prayer stretch; PPT in walking.. He reported, demonstrated and  taught-back understanding of these instructions.      PT SIGNATURE: Apolinar Fisher, PT   DATE TREATMENT INITIATED: 12/11/2020    Initial Certification  Certification Period: 3/11/2021  I certify that the therapy services are furnished while this patient is under my care.  The services outlined above are required by this patient, and will be reviewed every 90 days.     PHYSICIAN: Fabrizio Yip MD______________________________________________ DATE: ___________________     Please sign and return via fax to [phone].   Thank you so much for letting us work with Alex. I appreciate your letting us work with your patients. If you have any questions or concerns, please don't hesitate to contact me.

## 2020-12-17 ENCOUNTER — TREATMENT (OUTPATIENT)
Dept: PHYSICAL THERAPY | Facility: CLINIC | Age: 45
End: 2020-12-17

## 2020-12-17 PROCEDURE — 97140 MANUAL THERAPY 1/> REGIONS: CPT | Performed by: PHYSICAL THERAPIST

## 2020-12-17 NOTE — PROGRESS NOTES
"Physical Therapy Treatment Note    Patient: Alex Vann   : 1975  Referring practitioner: Fabrizio Yip, *  Date of Initial Visit:   Type: THERAPY  Noted: 2020  Today's Date: 2020  Patient seen for 2 sessions  Visit Diagnoses:  No diagnosis found.       Subjective Evaluation    History of Present Illness    Subjective comment: He has been sore wtih doing his stretches. He is being more mindful of how to protect his back. He is still having the radicular signs with symptoms down his leg when he drives and is always there to some degree. Pain  Current pain ratin  Location: mostly numbness           Objective   Manual Therapy     74828  Comments   Prone thoracic ext mobs Foam roll and man   Prone LS man distrx and jaime CAYLA PA mobs    sidelying right ITB IASTM  With foam roll   sidelying right piriformis deep tissue release    sidelying right LS manual distraction    Passive right piriformis and OI stretch        Timed Minutes 45     Dry Needle Location [ (1-2 muscles)/ (3 or more muscles)]   Location Size (\") Comment   jaime L4,5 post cut 2    Right sup cluneal n 2    Right inf gluteal n 3 Very tender   Right ITB 3 Just inf to troch and midway down   Right sural n 2    Right IP and short hip adductor 2 tender                      Total Timed Treatment:     45   mins  Total Time of Visit:             60   mins         Therapy Education/Self Care [id number]   Details: Piriformis stretch; kneeling thoracic ext and prayer stretches; PPT in walking; expectations from DN   Given Home Exercise Program   Progress: Reinforced   Who provided to: Patient   Level of understanding Verbalized   Timed Minutes       Assessment & Plan     Assessment  Assessment details: Today was his first visit after his eval. No goals met at this time. I wanted to try to dry needling today and he said he felt a bit looser after. His jaime hips are very tight into ext rotation and his right piri is quite tight " and painful with stretching. There may be other contributing factors that may make this want to guard more.    Plan  Plan details: Work more on hip joint capsule mobility and release of right piriformis.                Goals   STG by: 3 weeks Comments Status   Improve jaime thoracolumbar mobility    new   Improve muscle relaxation of piriformis    new   Improve jaime hip passive ER to WNL deg    new           LTG by: 6 weeks       Reports no radicular symptoms in right leg for a week.  still having some numbness with driving ongoing   Able to drive without exacerbation of pain    new   SLS either leg x 10 secs on right with stabiliity    new   Reports pain no greater than 1-2/10 when it does occur.    new   Improve Modified Oswestry to 5 or less    new   Independent with HEP for flexibility and core/hip stability.    new        Apolinar Fisher, PT  Physical Therapist

## 2020-12-18 ENCOUNTER — TREATMENT (OUTPATIENT)
Dept: PHYSICAL THERAPY | Facility: CLINIC | Age: 45
End: 2020-12-18

## 2020-12-18 DIAGNOSIS — M79.604 LOW BACK PAIN RADIATING TO RIGHT LEG: ICD-10-CM

## 2020-12-18 DIAGNOSIS — M54.50 LOW BACK PAIN RADIATING TO RIGHT LEG: ICD-10-CM

## 2020-12-18 DIAGNOSIS — M43.17 SPONDYLOLISTHESIS AT L5-S1 LEVEL: Primary | ICD-10-CM

## 2020-12-18 PROCEDURE — 97140 MANUAL THERAPY 1/> REGIONS: CPT | Performed by: PHYSICAL THERAPIST

## 2020-12-18 NOTE — PROGRESS NOTES
"Physical Therapy Treatment Note    Patient: Alex Vann   : 1975  Referring practitioner: Fabrizio Yip, *  Date of Initial Visit:   Type: THERAPY  Noted: 2020  Today's Date: 2020  Patient seen for 3 sessions  Visit Diagnoses:    ICD-10-CM ICD-9-CM   1. Spondylolisthesis at L5-S1 level  M43.17 756.12   2. Low back pain radiating to right leg  M54.5 724.2          Subjective Evaluation    History of Present Illness    Subjective comment: He was a little sore last night but today is OK. He was sore where the needles were. Pain  Current pain ratin  At worst pain rating: 3         Objective   Manual Therapy     12087  Comments   Right hip lat/caudal distx Used gait belt, strong Op   Supine right LE LAD    sidelying right pelvis post mob using MET to assist    Prone jaime CAYLA PA's    Prone anterior mob of right pelvis with leg elevated and OP at PSIS Attempted MET with hip flexor but hurt back so stopped   Passive right piriformis and OI stretch Still tight jaime , worse on right   sidelying right piriformis deep tissue release        Right IP stretch in Gaenslen's position Off edge of table   Timed Minutes 45     Dry Needle Location [ (1-2 muscles)/ (3 or more muscles)]   Location Size (\") Comment                                                    Total Timed Treatment:     45   mins  Total Time of Visit:             45  mins         Therapy Education/Self Care 11976   Details: 1/2 kneel anterior right pelvic self mob; piri stretch   Given Home Exercise Program   Progress: Reinforced and Modified   Who provided to: Patient   Level of understanding Verbalized   Timed Minutes       Assessment & Plan     Assessment  Assessment details: His pain is still staying about the same. I assessed today for pelvic alignment and he showed a positive right long sitting test for hypomobile right pelvis. This might explain some of his pain as his right pelvic mms tighten causing a double " crush on his sciatic. I focused more on trying to loosen that today.    Plan  Plan details: Assess pain after today and explore other areas where he may have some muscle guarding to get them to relax and try to unlock his right hip and pelvis. Progress with core/hip stability as well.                Goals   STG by: 3 weeks Comments Status   Improve jaime thoracolumbar mobility   ongoing   Improve muscle relaxation of piriformis  still quite tight and pain ongoing   Improve jaime hip passive ER to WNL deg Still very tight and guarded ongoing           LTG by: 6 weeks       Reports no radicular symptoms in right leg for a week.  still having some numbness with driving ongoing   Able to drive without exacerbation of pain    new   SLS either leg x 10 secs on right with stabiliity    new   Reports pain no greater than 1-2/10 when it does occur.    new   Improve Modified Oswestry to 5 or less    new   Independent with HEP for flexibility and core/hip stability.    new        Apolinar Fisher, PT  Physical Therapist

## 2020-12-21 ENCOUNTER — TREATMENT (OUTPATIENT)
Dept: PHYSICAL THERAPY | Facility: CLINIC | Age: 45
End: 2020-12-21

## 2020-12-21 DIAGNOSIS — M43.17 SPONDYLOLISTHESIS AT L5-S1 LEVEL: Primary | ICD-10-CM

## 2020-12-21 DIAGNOSIS — M54.50 LOW BACK PAIN RADIATING TO RIGHT LEG: ICD-10-CM

## 2020-12-21 DIAGNOSIS — M79.604 LOW BACK PAIN RADIATING TO RIGHT LEG: ICD-10-CM

## 2020-12-21 PROCEDURE — 97140 MANUAL THERAPY 1/> REGIONS: CPT | Performed by: PHYSICAL THERAPIST

## 2020-12-21 NOTE — PROGRESS NOTES
"Physical Therapy Treatment Note    Patient: Alex Vann   : 1975  Referring practitioner: Fabrizio Yip, *  Date of Initial Visit:   Type: THERAPY  Noted: 2020  Today's Date: 2020  Patient seen for 4 sessions  Visit Diagnoses:    ICD-10-CM ICD-9-CM   1. Spondylolisthesis at L5-S1 level  M43.17 756.12   2. Low back pain radiating to right leg  M54.5 724.2          Subjective Evaluation    History of Present Illness    Subjective comment: His c/c today is his right ITB. He was standing more yesterday. He did have some right posterior thigh pain driving to [employer]. He thinks his glute pain is better. Pain  Current pain ratin  At worst pain ratin         Objective   Manual Therapy     [id number]  Comments   Right hip lat/caudal distx Used gait belt, strong Op   Supine right LE LAD             Attempted MET with hip flexor but hurt back so stopped   Passive right piriformis and OI stretch Still tight jaime , worse on right   sidelying right piriformis deep tissue release    Right OI deep pressure Not particularly tender   Right ITB IASTM Foam roller; tender just under troch   Timed Minutes 40     Dry Needle Location [ (1-2 muscles)/ (3 or more muscles)]   Location Size (\") Comment                                                    Total Timed Treatment:     40   mins  Total Time of Visit:             45  mins         Therapy Education/Self Care 05587   Details: sidelying sustained hip abd wipers; SLS with tall posture; anatomy of piriformis, OT and sciatica   Given Home Exercise Program   Progress: Reinforced and Modified   Who provided to: Patient   Level of understanding Verbalized   Timed Minutes       Assessment & Plan     Assessment  Assessment details: He says that while walking, his right ITB snapped. With the weakness of his hip, the snapping may have caused it to swell and hurt while he still had to walk on it. His OI wasn't particularly symptomatic but I wanted to " check as he was c/o deep tenderness under his piriformis. We progressed with more hip stability today as ultimately this is what he will need to be better.     Plan  Plan details: Cont to progress hip and core stability while working on hip joint mobility.                Goals   STG by: 3 weeks Comments Status   Improve jaime thoracolumbar mobility   ongoing   Improve muscle relaxation of piriformis  still quite tight and pain ongoing   Improve jaime hip passive ER to WNL deg Still very tight and guarded ongoing           LTG by: 6 weeks       Reports no radicular symptoms in right leg for a week.  still having some numbness with driving ongoing   Able to drive without exacerbation of pain    new   SLS either leg x 10 secs on right with stabiliity    new   Reports pain no greater than 1-2/10 when it does occur.    new   Improve Modified Oswestry to 5 or less    new   Independent with HEP for flexibility and core/hip stability.    new        Apolinar Fisher, PT  Physical Therapist

## 2020-12-23 ENCOUNTER — TELEPHONE (OUTPATIENT)
Dept: ORTHOPEDICS | Facility: OTHER | Age: 45
End: 2020-12-23

## 2020-12-29 ENCOUNTER — TREATMENT (OUTPATIENT)
Dept: PHYSICAL THERAPY | Facility: CLINIC | Age: 45
End: 2020-12-29

## 2020-12-29 DIAGNOSIS — M43.17 SPONDYLOLISTHESIS AT L5-S1 LEVEL: Primary | ICD-10-CM

## 2020-12-29 DIAGNOSIS — M79.604 LOW BACK PAIN RADIATING TO RIGHT LEG: ICD-10-CM

## 2020-12-29 DIAGNOSIS — M54.50 LOW BACK PAIN RADIATING TO RIGHT LEG: ICD-10-CM

## 2020-12-29 PROCEDURE — 97110 THERAPEUTIC EXERCISES: CPT | Performed by: PHYSICAL THERAPIST

## 2020-12-29 PROCEDURE — 97140 MANUAL THERAPY 1/> REGIONS: CPT | Performed by: PHYSICAL THERAPIST

## 2020-12-29 NOTE — PROGRESS NOTES
"Physical Therapy Treatment Note    Patient: Alex Vann   : 1975  Referring practitioner: Fabrizio Yip, *  Date of Initial Visit:   Type: THERAPY  Noted: 2020  Today's Date: 2020  Patient seen for 5 sessions  Visit Diagnoses:    ICD-10-CM ICD-9-CM   1. Spondylolisthesis at L5-S1 level  M43.17 756.12   2. Low back pain radiating to right leg  M54.5 724.2          Subjective Evaluation    History of Present Illness    Subjective comment: He has been focusing more on his hip stability and is starting to feel stronger there. He drove to the [facility] and didn't feel as much thigh pain or radicular symptoms. Pain  Current pain ratin         Objective   Manual Therapy     81204  Comments   Prone thoracic ext mobs    Prone LS man distraction    Prone jaime CAYLA PA's    sidelying right piriformis deep tissue release        Right ITB IASTM Foam roller; tender just under troch   Timed Minutes 35     Therapeutic Exercises    21044 Comments   TA bracing with jaime marching alternating 10 eac   Lower abdominal crunch 8   Prone plank off knees, progressed to toes Knees 2 x 15 sec, toes 1 x 10 sec   Side plank right off knee        Timed Minutes 10       Dry Needle Location [ (1-2 muscles)/ (3 or more muscles)]   Location Size (\") Comment                                                    Total Timed Treatment:     45   mins  Total Time of Visit:             45  mins         Therapy Education/Self Care 63715   Details: Progress to plank off knees and to toes as able; try side plank for glute med and obliques   Given Home Exercise Program   Progress: Reinforced and Progressed   Who provided to: Patient   Level of understanding Verbalized   Timed Minutes       Assessment & Plan     Assessment  Assessment details: His radicular symptoms appear to be calming down and his primary pain now is more around his right ITB. He has been working on his HEP for hip stability which is slowly " improving.     Plan  Plan details: Cont to progress hip and core stability.                Goals   STG by: 3 weeks Comments Status   Improve jaime thoracolumbar mobility   ongoing   Improve muscle relaxation of piriformis  still quite tight and pain ongoing   Improve jaime hip passive ER to WNL deg Still very tight and guarded ongoing           LTG by: 6 weeks       Reports no radicular symptoms in right leg for a week.  still having some numbness with driving ongoing   Able to drive without exacerbation of pain    new   SLS either leg x 10 secs on right with stabiliity    new   Reports pain no greater than 1-2/10 when it does occur.    new   Improve Modified Oswestry to 5 or less    new   Independent with HEP for flexibility and core/hip stability.    new        Apolinar Fisher, PT  Physical Therapist

## 2020-12-31 ENCOUNTER — TELEPHONE (OUTPATIENT)
Dept: PHYSICAL THERAPY | Facility: CLINIC | Age: 45
End: 2020-12-31

## 2021-07-16 DIAGNOSIS — C62.91 MALIGNANT NEOPLASM OF RIGHT TESTIS, UNSPECIFIED WHETHER DESCENDED OR UNDESCENDED (HCC): Primary | ICD-10-CM

## 2021-07-21 ENCOUNTER — HOSPITAL ENCOUNTER (OUTPATIENT)
Dept: GENERAL RADIOLOGY | Facility: HOSPITAL | Age: 46
Discharge: HOME | End: 2021-07-21
Admitting: NURSE PRACTITIONER

## 2021-07-21 ENCOUNTER — OFFICE VISIT (OUTPATIENT)
Dept: ONCOLOGY | Facility: CLINIC | Age: 46
End: 2021-07-21

## 2021-07-21 ENCOUNTER — LAB (OUTPATIENT)
Dept: LAB | Facility: HOSPITAL | Age: 46
End: 2021-07-21

## 2021-07-21 VITALS
DIASTOLIC BLOOD PRESSURE: 84 MMHG | BODY MASS INDEX: 32.13 KG/M2 | RESPIRATION RATE: 16 BRPM | HEIGHT: 68 IN | SYSTOLIC BLOOD PRESSURE: 118 MMHG | TEMPERATURE: 97.7 F | OXYGEN SATURATION: 95 % | WEIGHT: 212 LBS | HEART RATE: 72 BPM

## 2021-07-21 DIAGNOSIS — C62.91 MALIGNANT NEOPLASM OF RIGHT TESTIS, UNSPECIFIED WHETHER DESCENDED OR UNDESCENDED (HCC): ICD-10-CM

## 2021-07-21 DIAGNOSIS — E78.5 HYPERLIPIDEMIA, UNSPECIFIED HYPERLIPIDEMIA TYPE: ICD-10-CM

## 2021-07-21 DIAGNOSIS — C62.91 MALIGNANT NEOPLASM OF RIGHT TESTIS, UNSPECIFIED WHETHER DESCENDED OR UNDESCENDED (HCC): Primary | ICD-10-CM

## 2021-07-21 LAB
ALBUMIN SERPL-MCNC: 4.9 G/DL (ref 3.5–5.2)
ALBUMIN/GLOB SERPL: 2 G/DL
ALP SERPL-CCNC: 55 U/L (ref 39–117)
ALPHA-FETOPROTEIN: 2.56 NG/ML (ref 0–8.3)
ALT SERPL W P-5'-P-CCNC: 29 U/L (ref 1–41)
ANION GAP SERPL CALCULATED.3IONS-SCNC: 8 MMOL/L (ref 5–15)
AST SERPL-CCNC: 18 U/L (ref 1–40)
BASOPHILS # BLD AUTO: 0.03 10*3/MM3 (ref 0–0.2)
BASOPHILS NFR BLD AUTO: 0.6 % (ref 0–1.5)
BILIRUB SERPL-MCNC: 0.3 MG/DL (ref 0–1.2)
BUN SERPL-MCNC: 17 MG/DL (ref 6–20)
BUN/CREAT SERPL: 23.9 (ref 7–25)
CALCIUM SPEC-SCNC: 9.6 MG/DL (ref 8.6–10.5)
CHLORIDE SERPL-SCNC: 107 MMOL/L (ref 98–107)
CO2 SERPL-SCNC: 25 MMOL/L (ref 22–29)
CREAT SERPL-MCNC: 0.71 MG/DL (ref 0.76–1.27)
DEPRECATED RDW RBC AUTO: 41.3 FL (ref 37–54)
EOSINOPHIL # BLD AUTO: 0.21 10*3/MM3 (ref 0–0.4)
EOSINOPHIL NFR BLD AUTO: 4.4 % (ref 0.3–6.2)
ERYTHROCYTE [DISTWIDTH] IN BLOOD BY AUTOMATED COUNT: 12.6 % (ref 12.3–15.4)
GFR SERPL CREATININE-BSD FRML MDRD: 119 ML/MIN/1.73
GLOBULIN UR ELPH-MCNC: 2.4 GM/DL
GLUCOSE SERPL-MCNC: 111 MG/DL (ref 65–99)
HCT VFR BLD AUTO: 45.4 % (ref 37.5–51)
HGB BLD-MCNC: 15.5 G/DL (ref 13–17.7)
IMM GRANULOCYTES # BLD AUTO: 0.01 10*3/MM3 (ref 0–0.05)
IMM GRANULOCYTES NFR BLD AUTO: 0.2 % (ref 0–0.5)
LDH SERPL-CCNC: 126 U/L (ref 135–225)
LYMPHOCYTES # BLD AUTO: 1.28 10*3/MM3 (ref 0.7–3.1)
LYMPHOCYTES NFR BLD AUTO: 26.9 % (ref 19.6–45.3)
MCH RBC QN AUTO: 30.2 PG (ref 26.6–33)
MCHC RBC AUTO-ENTMCNC: 34.1 G/DL (ref 31.5–35.7)
MCV RBC AUTO: 88.5 FL (ref 79–97)
MONOCYTES # BLD AUTO: 0.49 10*3/MM3 (ref 0.1–0.9)
MONOCYTES NFR BLD AUTO: 10.3 % (ref 5–12)
NEUTROPHILS NFR BLD AUTO: 2.74 10*3/MM3 (ref 1.7–7)
NEUTROPHILS NFR BLD AUTO: 57.6 % (ref 42.7–76)
NRBC BLD AUTO-RTO: 0 /100 WBC (ref 0–0.2)
PLATELET # BLD AUTO: 215 10*3/MM3 (ref 140–450)
PMV BLD AUTO: 9.2 FL (ref 6–12)
POTASSIUM SERPL-SCNC: 4.1 MMOL/L (ref 3.5–5.2)
PROT SERPL-MCNC: 7.3 G/DL (ref 6–8.5)
RBC # BLD AUTO: 5.13 10*6/MM3 (ref 4.14–5.8)
SODIUM SERPL-SCNC: 140 MMOL/L (ref 136–145)
WBC # BLD AUTO: 4.76 10*3/MM3 (ref 3.4–10.8)

## 2021-07-21 PROCEDURE — 80053 COMPREHEN METABOLIC PANEL: CPT

## 2021-07-21 PROCEDURE — 36415 COLL VENOUS BLD VENIPUNCTURE: CPT

## 2021-07-21 PROCEDURE — 84702 CHORIONIC GONADOTROPIN TEST: CPT

## 2021-07-21 PROCEDURE — 82105 ALPHA-FETOPROTEIN SERUM: CPT

## 2021-07-21 PROCEDURE — 99214 OFFICE O/P EST MOD 30 MIN: CPT | Performed by: NURSE PRACTITIONER

## 2021-07-21 PROCEDURE — 85025 COMPLETE CBC W/AUTO DIFF WBC: CPT

## 2021-07-21 PROCEDURE — 83615 LACTATE (LD) (LDH) ENZYME: CPT

## 2021-07-21 PROCEDURE — 71046 X-RAY EXAM CHEST 2 VIEWS: CPT

## 2021-07-21 RX ORDER — MULTIPLE VITAMINS W/ MINERALS TAB 9MG-400MCG
1 TAB ORAL DAILY
COMMUNITY

## 2021-07-21 NOTE — PROGRESS NOTES
[facility]  HEMATOLOGY & ONCOLOGY    [facility]ATOLOGY AND ONCOLOGY  [address]  [place] [address] [phone]    Patient Name: Alex Vann  Encounter Date: 07/21/2021  YOB: 1975  Patient Number: [id number]    Chief Complaint   Patient presents with   • testis cancer     here for f/u       REASON FOR VISIT: Mr. Vann is a very pleasant gentleman here today in follow-up for his history of testicular carcinoma for which was diagnosed in 2008.  His oncologic history is detailed below.  He continues on surveillance and has continued to show no signs of recurrence of disease.  He has been undergoing a yearly chest x-ray which he has not yet had for 2021 but up to this point has been unremarkable.  His last CT scans were actually in 2017 and unremarkable.  His tumor markers consisting of an alpha-fetoprotein has been unremarkable as well as the beta hCG.    He presents today feeling well.  He has no complaints.  He states he has some back pain off and on and has seen Dr. Yip.  He had an MRI completed on 11/27/2020 that found Bilateral L5 pars defects redemonstrated with grade 1  anterolisthesis and severe RIGHT and moderate LEFT foraminal narrowing. At L3-L4, there is a diffuse disc bulge and extra foraminal disc material on the RIGHT comes into close proximity with the exited right L3 nerve root. No definite mass effect on the nerve root.   Mild narrowing of the RIGHT L4-L5 foramen.  Again, this is being followed by Dr. Yip.  He has had no abdominal pain no nausea or vomiting.  No headaches or dizziness.  No fever chills or night sweats.  He has lost some weight but this has been intentional.  He states he is changed how he is eating and has been exercising more.    Oncology/Hematology History Overview Note   Mr. Vann is a pleasant gentleman whom sought urologic attention because of a  vasectomy. The patient underwent surgery on 7/22/2008 without complications. He was seen again because of a scrotal mass on 8/14/2008. He was examined by Dr. Guadalupe at thattime and was felt to have a sperm granuloma status post vasectomy and this was not felt to be of any consequence. This was a mass 1cm nodule above his right testicle.The patient returned to see Dr. Mack June 21, 2011, with the finding of a right scrotal mass sudden in onset. The mass was identified approximately two weeks prior to visit. It had been increasing in size with no associated pain. The mass was identified during a testicularself exam. Dr. Mack examined the right testicle revealing a mildly tender, firm, marble sized mass lower aspect of the testicle.    Ultrasound examination was performed finding a 2.8 cm heterogeneous mass in the right testicle. There were some internal cysticchanges consistent with necrosis. This was worrisome for testicular carcinoma. There is diffuse microlithiasis involving the right testes.The left testes shows diffuse microlithiasis but less severe on the right. No lesion was appreciated. There was an epidermal cystinvolving the right testes measuring 5 mm and has a benign appearance and a varicocele on the left testes noted as well.    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 Tumor was a maximum of 3.7 cm in diameter, no lymphovascular invasion is noted. The margins were clear and spermatic cord was unrevealing. The patient has undergonea CT scan of the chest which is normal. A CT abdomen and pelvis which is reported to show a 3 cm left hepatic dome compatible with a  hemangioma, but ultrasound correlation recommended.  His beta HCG was elevated at 8 with a normal being 03. His alpha fetoprotein [id number] with normal being 0  to 8.    Patient underwent a retroperitoneal lymph node dissection on August 16, 2011. Results of this surgery showed negative nodes. Stage I disease. On 10/10/2011 his alphafetoprotein was 3.6 and beta hCG was undetectable. His LDH was normal.On 12/16/2011 his alphafetoprotein was 3.5 and a beta hCG was 194. His beta hCG was repeated in our office was 220. Outpatient CAT scans were performed on 12/30/2011. The chest has been unremarkable. CT scan of the abdomen and pelvis revealed small lymphnodes in the retroperitoneum and a 2.5 cm mass effect. Question of seroma versus lymph node involvement was raised. In this setting arising markers within 4 months of his retroperitoneal lymph node dissection and the fact that these markers were negative 2 months after his  dissection it was consistent with recurrence of his disease and the need for chemotherapy treatment.    The patient received 3 cycles of BEP therapy from 1/16/2012 to 3/12/2012. A repeat CAT scan of his abdomen and pelvis on 3/19/2014 post his 3 cycles of chemotherapy showed a mass lesion in his retroperitoneum. He return to [facility] and underwent a retroperitoneal dissection of this mass by Dr. Paniagua, removing a benign teratoma on May 3, 2012. He is undergoing surveillance for recurrence at this time.       Malignant neoplasm of right testis (CMS/HCC)   10/18/2016 Initial Diagnosis    Malignant neoplasm of right testis           PAST MEDICAL HISTORY:  ALLERGIES:  Allergies   Allergen Reactions   • Sulfa Antibiotics Rash       CURRENT MEDICATIONS:  Outpatient Encounter Medications as of 7/21/2021   Medication Sig Dispense Refill   • irbesartan (AVAPRO) 150 MG tablet Take 150 mg by mouth Daily.     • levocetirizine (XYZAL) 5 MG tablet Take 5 mg by mouth Every Evening.     • LIVALO 4 MG tablet Take 1 tablet by mouth Daily.     • meloxicam (MOBIC) 15 MG tablet Take 15 mg by mouth Daily.     • multivitamin with minerals tablet tablet  Take 1 tablet by mouth Daily.     • vitamin B-12 (CYANOCOBALAMIN) 500 MCG tablet Take 500 mcg by mouth Daily.     • vitamin D (ERGOCALCIFEROL) 26158 units capsule capsule Take 50,000 Units by mouth Every 7 (Seven) Days.     • [DISCONTINUED] montelukast (SINGULAIR) 4 MG chewable tablet Chew 4 mg.       No facility-administered encounter medications on file as of 7/21/2021.       ADULT ILLNESSES:  Patient Active Problem List   Diagnosis Code   • Malignant neoplasm of right testis (CMS/HCC) C62.91   • Teratoma D48.9   • Spondylolisthesis of lumbar region M43.16   • Obesity (BMI 30-39.9) E66.9   • Former tobacco use Z87.891       SURGERIES:  Past Surgical History:   Procedure Laterality Date   • KNEE ARTHROSCOPY     • TESTICLE SURGERY      Granuloma of the right testicle   • VASECTOMY         HEALTH MAINTENANCE ITEMS:  <no information>  Last Completed Colonoscopy     Not yet indicated        FAMILY HISTORY:  Family History   Problem Relation Age of Onset   • Mental illness Paternal Uncle    • Cancer Mother    • Diabetes Father    • Hypertension Father        SOCIAL HISTORY:  Social History     Socioeconomic History   • Marital status:      Spouse name: Not on file   • Number of children: Not on file   • Years of education: Not on file   • Highest education level: Not on file   Tobacco Use   • Smoking status: Former Smoker   • Smokeless tobacco: Never Used   Substance and Sexual Activity   • Alcohol use: Yes   • Drug use: Never   • Sexual activity: Defer       REVIEW OF SYSTEMS:  Review of Systems   Constitutional: Negative for activity change, appetite change, chills, diaphoresis, fatigue, fever and unexpected weight loss.   HENT: Negative for nosebleeds, sinus pressure, sore throat and voice change.    Eyes: Negative for blurred vision, double vision and visual disturbance.   Respiratory: Negative for cough and shortness of breath.    Cardiovascular: Negative for chest pain, palpitations and leg swelling.  "  Gastrointestinal: Negative for abdominal pain, anal bleeding, blood in stool, constipation, diarrhea, nausea and vomiting.   Genitourinary: Negative for dysuria, frequency, hematuria, urgency and urinary incontinence.   Musculoskeletal: Positive for back pain (Intermittent). Negative for arthralgias and myalgias.   Skin: Negative for rash and skin lesions.   Neurological: Negative for dizziness, weakness and headache.   Hematological: Negative for adenopathy. Does not bruise/bleed easily.   Psychiatric/Behavioral: Negative for sleep disturbance, suicidal ideas and depressed mood.       /84   Pulse 72   Temp 97.7 °F (36.5 °C) (Temporal)   Resp 16   Ht 172.7 cm (68\")   Wt 96.2 kg (212 lb)   SpO2 95%   BMI 32.23 kg/m²  Body surface area is 2.1 meters squared.  Pain Score    07/21/21 0824   PainSc: 0-No pain       Physical Exam:  Physical Exam  Vitals reviewed.   Constitutional:       Appearance: Normal appearance. He is well-developed.   HENT:      Head: Atraumatic.   Eyes:      General: No scleral icterus.     Pupils: Pupils are equal, round, and reactive to light.   Neck:      Trachea: Trachea normal.   Cardiovascular:      Rate and Rhythm: Normal rate and regular rhythm.      Pulses: Normal pulses.      Heart sounds: No murmur heard.     Pulmonary:      Effort: Pulmonary effort is normal.      Breath sounds: Normal breath sounds. No wheezing, rhonchi or rales.   Abdominal:      Palpations: Abdomen is soft.      Tenderness: There is no abdominal tenderness. There is no guarding.   Musculoskeletal:      Cervical back: Neck supple.   Lymphadenopathy:      Cervical: No cervical adenopathy.      Upper Body:      Right upper body: No supraclavicular or axillary adenopathy.      Left upper body: No supraclavicular or axillary adenopathy.   Skin:     General: Skin is warm and dry.   Neurological:      General: No focal deficit present.      Mental Status: He is alert and oriented to person, place, and time. "      Sensory: No sensory deficit.   Psychiatric:         Mood and Affect: Mood normal.         Behavior: Behavior normal.         Alex Vann reports a pain score of 0.    Patient's Body mass index is 32.23 kg/m². indicating that he is obese (BMI >30).  We discussed Deferring to primary care provider..      LABS    Lab Results - Last 18 Months   Lab Units 07/21/21  0814   HEMOGLOBIN g/dL 15.5   HEMATOCRIT % 45.4   MCV fL 88.5   WBC 10*3/mm3 4.76   RDW % 12.6   MPV fL 9.2   PLATELETS 10*3/mm3 215   IMM GRAN % % 0.2   NEUTROS ABS 10*3/mm3 2.74   LYMPHS ABS 10*3/mm3 1.28   MONOS ABS 10*3/mm3 0.49   EOS ABS 10*3/mm3 0.21   BASOS ABS 10*3/mm3 0.03   IMMATURE GRANS (ABS) 10*3/mm3 0.01   NRBC /100 WBC 0.0     ASSESSMENT  1.   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  · Stage: AJCC TNM stage pT1 pN0 M0 grade: S1, stage IS  · Treatment: Right radical orchiectomy on 6/24/2011, retroperitoneal lymph node dissection on August 16, 2011. Results of this surgery showed negative nodes. On 10/10/2011 his alphafetoprotein was 3.6 and beta hCG was undetectable. CAT scans were performed on 12/30/2011. The chest has been unremarkable. CT scan of the abdomen and pelvis revealed small lymphnodes in the retroperitoneum and a 2.5 cm mass effect. Question of seroma versus lymph node involvement was raised. In this setting arising markers within 4 months of his retroperitoneal lymph node dissection and the fact that these markers were negative 2 months after his              dissection it was consistent with recurrence of his disease and the need for                chemotherapy treatment.              The patient received 3 cycles of BEP therapy from 1/16/2012 to 3/12/2012. A              repeat CAT scan of his abdomen and pelvis on 3/19/2014 post his 3 cycles                of chemotherapy showed a mass  lesion in his retroperitoneum. He return [facility] and underwent a retroperitoneal dissection             of this mass by Dr. Paniagua, removing a benign teratoma on May 3, 2012.  · Disease status: Clinically no evidence of disease.  He is undergoing surveillance for recurrence at this time.  2.  Back pain secondary to Bilateral L5 pars defects redemonstrated with grade 1 anterolisthesis and severe RIGHT and moderate LEFT foraminal narrowing.  L3-L4, there is a diffuse disc bulge and extra foraminal disc material on the RIGHT comes into close proximity with the exited RIGHT L3 nerve root. Followed by Dr Yip  3.  Hematologically stable  4.  Hyperlipidemia on Livalo, managed by pcp    PLAN  1.  Reviewed lab work with patient today  2.  Advised patient he will be contacted with the pending results.  3.  Encouraged patient to continue to follow with pcp and other specialists  4.  Return in 1 year for follow up. He will need CBC, CMP, Beta HCG, AFP and LDH    Leni Lemus, APRN  07/21/2021

## 2021-07-22 ENCOUNTER — TELEPHONE (OUTPATIENT)
Dept: ONCOLOGY | Facility: CLINIC | Age: 46
End: 2021-07-22

## 2021-07-22 LAB — HCG INTACT+B SERPL-ACNC: <1 MIU/ML (ref 0–3)

## 2021-07-22 NOTE — TELEPHONE ENCOUNTER
Notified pt by v/m of normal chest xr and tumor markers      ----- Message from ARPITA Storey sent at 7/22/2021 10:07 AM CDT -----  Please let Alex know that his tumor markers are normal

## 2021-08-20 ENCOUNTER — HOSPITAL ENCOUNTER (OUTPATIENT)
Dept: INFUSION THERAPY | Facility: HOSPITAL | Age: 46
Discharge: HOME | End: 2021-08-20
Admitting: FAMILY MEDICINE

## 2021-08-20 VITALS
TEMPERATURE: 97.8 F | DIASTOLIC BLOOD PRESSURE: 82 MMHG | OXYGEN SATURATION: 96 % | SYSTOLIC BLOOD PRESSURE: 120 MMHG | WEIGHT: 208 LBS | HEART RATE: 65 BPM | BODY MASS INDEX: 31.52 KG/M2 | HEIGHT: 68 IN | RESPIRATION RATE: 20 BRPM

## 2021-08-20 DIAGNOSIS — C62.11 MALIGNANT NEOPLASM OF DESCENDED RIGHT TESTIS (HCC): ICD-10-CM

## 2021-08-20 PROCEDURE — 96413 CHEMO IV INFUSION 1 HR: CPT

## 2021-08-20 PROCEDURE — M0243 CASIRIVI AND IMDEVI INFUSION: HCPCS | Performed by: FAMILY MEDICINE

## 2021-08-20 PROCEDURE — 25010000006 INJECTION, CASIRIVIMAB AND IMDEVIMAB, 1200 MG: Performed by: FAMILY MEDICINE

## 2021-08-20 RX ORDER — EPINEPHRINE 0.3 MG/.3ML
0.3 INJECTION SUBCUTANEOUS ONCE AS NEEDED
Status: DISCONTINUED | OUTPATIENT
Start: 2021-08-20 | End: 2021-08-22 | Stop reason: HOSPADM

## 2021-08-20 RX ORDER — METHYLPREDNISOLONE SODIUM SUCCINATE 125 MG/2ML
125 INJECTION, POWDER, LYOPHILIZED, FOR SOLUTION INTRAMUSCULAR; INTRAVENOUS ONCE AS NEEDED
Status: DISCONTINUED | OUTPATIENT
Start: 2021-08-20 | End: 2021-08-22 | Stop reason: HOSPADM

## 2021-08-20 RX ORDER — DIPHENHYDRAMINE HYDROCHLORIDE 50 MG/ML
50 INJECTION INTRAMUSCULAR; INTRAVENOUS ONCE AS NEEDED
Status: DISCONTINUED | OUTPATIENT
Start: 2021-08-20 | End: 2021-08-22 | Stop reason: HOSPADM

## 2021-08-20 RX ORDER — SODIUM CHLORIDE 9 MG/ML
30 INJECTION, SOLUTION INTRAVENOUS ONCE
Status: COMPLETED | OUTPATIENT
Start: 2021-08-20 | End: 2021-08-20

## 2021-08-20 RX ADMIN — SODIUM CHLORIDE 30 ML: 9 INJECTION, SOLUTION INTRAVENOUS at 07:49

## 2021-08-20 RX ADMIN — CASIRIVIMAB AND IMDEVIMAB: 600; 600 INJECTION, SOLUTION, CONCENTRATE INTRAVENOUS at 07:25

## 2021-08-20 NOTE — PATIENT INSTRUCTIONS
Patient tolerated complete COVID infusion.  No signs/symptoms of medication reaction.      Patient given education on COVID and medication.  Patient verbalized understanding.  Pt ambulatory to car by self upon discharge.  AVS Printed.

## 2023-08-08 ENCOUNTER — HOSPITAL ENCOUNTER (OUTPATIENT)
Dept: GENERAL RADIOLOGY | Facility: HOSPITAL | Age: 48
Discharge: HOME | End: 2023-08-08
Admitting: NURSE PRACTITIONER
Payer: COMMERCIAL

## 2023-08-08 ENCOUNTER — TRANSCRIBE ORDERS (OUTPATIENT)
Dept: GENERAL RADIOLOGY | Facility: HOSPITAL | Age: 48
End: 2023-08-08
Payer: COMMERCIAL

## 2023-08-08 DIAGNOSIS — Z85.47 HISTORY OF TESTICULAR CANCER: Primary | ICD-10-CM

## 2023-08-08 DIAGNOSIS — Z85.47 HISTORY OF TESTICULAR CANCER: ICD-10-CM

## 2023-08-08 PROCEDURE — 71046 X-RAY EXAM CHEST 2 VIEWS: CPT

## 2024-08-02 ENCOUNTER — HOSPITAL ENCOUNTER (OUTPATIENT)
Dept: GENERAL RADIOLOGY | Facility: HOSPITAL | Age: 49
Discharge: HOME | End: 2024-08-02
Admitting: NURSE PRACTITIONER
Payer: COMMERCIAL

## 2024-08-02 ENCOUNTER — TRANSCRIBE ORDERS (OUTPATIENT)
Dept: ADMINISTRATIVE | Facility: HOSPITAL | Age: 49
End: 2024-08-02
Payer: COMMERCIAL

## 2024-08-02 DIAGNOSIS — Z85.47 PERSONAL HISTORY OF MALIGNANT NEOPLASM OF TESTIS: Primary | ICD-10-CM

## 2024-08-02 PROCEDURE — 71046 X-RAY EXAM CHEST 2 VIEWS: CPT

## 2024-10-31 ENCOUNTER — OFFICE VISIT (OUTPATIENT)
Dept: GASTROENTEROLOGY | Facility: CLINIC | Age: 49
End: 2024-10-31
Payer: COMMERCIAL

## 2024-10-31 VITALS
BODY MASS INDEX: 35.16 KG/M2 | OXYGEN SATURATION: 98 % | TEMPERATURE: 97.7 F | SYSTOLIC BLOOD PRESSURE: 130 MMHG | HEART RATE: 76 BPM | HEIGHT: 68 IN | WEIGHT: 232 LBS | DIASTOLIC BLOOD PRESSURE: 88 MMHG

## 2024-10-31 DIAGNOSIS — Z83.719 FAMILY HX COLONIC POLYPS: ICD-10-CM

## 2024-10-31 DIAGNOSIS — Z12.11 ENCOUNTER FOR SCREENING FOR MALIGNANT NEOPLASM OF COLON: Primary | ICD-10-CM

## 2024-10-31 DIAGNOSIS — Z80.0 FAMILY HX OF COLON CANCER: ICD-10-CM

## 2024-10-31 PROCEDURE — S0285 CNSLT BEFORE SCREEN COLONOSC: HCPCS | Performed by: NURSE PRACTITIONER

## 2024-10-31 RX ORDER — SODIUM PICOSULFATE, MAGNESIUM OXIDE, AND ANHYDROUS CITRIC ACID 10; 3.5; 12 MG/160ML; G/160ML; G/160ML
160 LIQUID ORAL ONCE
Qty: 320 ML | Refills: 0 | Status: SHIPPED | OUTPATIENT
Start: 2024-10-31 | End: 2024-10-31

## 2024-10-31 NOTE — PROGRESS NOTES
[facility] Gastroenterology    Primary Physician Justin Alcaraz MD    10/31/2024    Alex Vann   1975      Chief Complaint   Patient presents with    Colonoscopy       Subjective     HPI    Alex Vann is a 49 y.o. male who presents as a referral for preventative maintenance. He has no complaints of nausea or vomiting. No change in bowels. No wt loss. No BRBPR. No melena. No abdominal pain.         Colonoscopy, Scan (10/30/2014 00:00) recall age 49.   Mother and father had colon polyps.   Maternal GF had colon cancer.         Past Medical History:   Diagnosis Date    ADD (attention deficit disorder)     Hyperlipidemia     Malignant neoplasm of right testis 10/18/2016    Melanoma     Osteoarthritis     Sleep disorder     Testicular mass        Past Surgical History:   Procedure Laterality Date    COLONOSCOPY  10/30/2014    internal hemorrhoids    KNEE ARTHROSCOPY      MELANOMA WIDE LOCAL EXCISION      PORTACATH PLACEMENT      TESTICLE SURGERY      Granuloma of the right testicle    VASECTOMY         Outpatient Medications Marked as Taking for the 10/31/24 encounter (Office Visit) with Yani Kothari APRN   Medication Sig Dispense Refill    Inclisiran Sodium (LEQVIO SC) Inject  under the skin into the appropriate area as directed Every 6 (Six) Months.      irbesartan (AVAPRO) 150 MG tablet Take 1 tablet by mouth Daily.      levocetirizine (XYZAL) 5 MG tablet Take 1 tablet by mouth Every Evening.      LIVALO 4 MG tablet Take 1 tablet by mouth Daily.      meloxicam (MOBIC) 15 MG tablet Take 1 tablet by mouth Daily.      multivitamin with minerals tablet tablet Take 1 tablet by mouth Daily.      vitamin B-12 (CYANOCOBALAMIN) 500 MCG tablet Take 1 tablet by mouth Daily.      VITAMIN D PO Take  by mouth Daily.         Allergies   Allergen Reactions    Sulfa Antibiotics Rash       Social History     Socioeconomic History    Marital status:    Tobacco Use    Smoking status:  Former    Smokeless tobacco: Never   Substance and Sexual Activity    Alcohol use: Yes     Comment: mod    Drug use: Never    Sexual activity: Defer       Family History   Problem Relation Age of Onset    Colon polyps Mother     Cancer Mother     Diabetes Father     Hypertension Father     Colon polyps Father     Mental illness Paternal Uncle     Colon cancer Maternal Grandfather        Review of Systems   Constitutional:  Negative for chills, fever and unexpected weight change.   Respiratory:  Negative for shortness of breath.    Cardiovascular:  Negative for chest pain.   Gastrointestinal:  Negative for abdominal distention, abdominal pain, anal bleeding, blood in stool, constipation, diarrhea, nausea and vomiting.       Objective     Vitals:    10/31/24 0830   BP: 130/88   Pulse: 76   Temp: 97.7 °F (36.5 °C)   SpO2: 98%         10/31/24  0830   Weight: 105 kg (232 lb)     Body mass index is 35.28 kg/m².    Physical Exam  Vitals reviewed.   Constitutional:       General: He is not in acute distress.     Appearance: He is well-developed.   HENT:      Head: Normocephalic and atraumatic.   Eyes:      General: No scleral icterus.  Neck:      Vascular: No JVD.   Cardiovascular:      Rate and Rhythm: Normal rate and regular rhythm.      Heart sounds: Normal heart sounds.   Pulmonary:      Effort: Pulmonary effort is normal.      Breath sounds: Normal breath sounds.   Abdominal:      General: Bowel sounds are normal. There is no distension.      Palpations: Abdomen is soft.      Tenderness: There is no abdominal tenderness.   Musculoskeletal:         General: Normal range of motion.      Cervical back: Neck supple.      Right lower leg: No edema.      Left lower leg: No edema.   Skin:     General: Skin is warm and dry.   Neurological:      Mental Status: He is alert.      Comments: Oriented    Psychiatric:         Behavior: Behavior normal.         Imaging Results (Most Recent)       None            Assessment & Plan      Diagnoses and all orders for this visit:    1. Encounter for screening for malignant neoplasm of colon (Primary)  -     Case Request; Standing  -     Case Request    2. Family hx colonic polyps    3. Family hx of colon cancer  Comments:  SDR    Other orders  -     Sod Picosulfate-Mag Ox-Cit Acd (Clenpiq) 10-3.5-12 MG-GM -GM/160ML solution; Take 160 mL by mouth 1 (One) Time for 1 dose. Take as directed  Dispense: 320 mL; Refill: 0  -     Implement Anesthesia Orders Day of Procedure; Standing      Schedule colonoscopy.        Hold mobic 1 week prior to procedure..         COLONOSCOPY WITH ANESTHESIA (N/A)  All risks, benefits, alternatives, and indications of colonoscopy procedure have been discussed with the patient. Risks to include perforation of the colon requiring possible surgery or colostomy, risk of bleeding from biopsies or removal of colon tissue, possibility of missing a colon polyp or cancer, or adverse drug reaction.  Benefits to include the diagnosis and management of disease of the colon and rectum. Alternatives to include barium enema, radiographic evaluation, lab testing or no intervention. Pt verbalizes understanding and agrees.     There are no Patient Instructions on file for this visit.    Yani Kothari, APRN

## 2024-12-13 ENCOUNTER — ANESTHESIA EVENT (OUTPATIENT)
Dept: GASTROENTEROLOGY | Facility: HOSPITAL | Age: 49
End: 2024-12-13
Payer: COMMERCIAL

## 2024-12-13 ENCOUNTER — HOSPITAL ENCOUNTER (OUTPATIENT)
Facility: HOSPITAL | Age: 49
Setting detail: HOSPITAL OUTPATIENT SURGERY
Discharge: HOME | End: 2024-12-13
Attending: INTERNAL MEDICINE | Admitting: INTERNAL MEDICINE
Payer: COMMERCIAL

## 2024-12-13 ENCOUNTER — ANESTHESIA (OUTPATIENT)
Dept: GASTROENTEROLOGY | Facility: HOSPITAL | Age: 49
End: 2024-12-13
Payer: COMMERCIAL

## 2024-12-13 VITALS
HEIGHT: 68 IN | TEMPERATURE: 96.6 F | RESPIRATION RATE: 20 BRPM | WEIGHT: 220 LBS | BODY MASS INDEX: 33.34 KG/M2 | SYSTOLIC BLOOD PRESSURE: 94 MMHG | OXYGEN SATURATION: 92 % | HEART RATE: 75 BPM | DIASTOLIC BLOOD PRESSURE: 58 MMHG

## 2024-12-13 DIAGNOSIS — Z12.11 ENCOUNTER FOR SCREENING FOR MALIGNANT NEOPLASM OF COLON: ICD-10-CM

## 2024-12-13 PROCEDURE — 25010000002 PROPOFOL 10 MG/ML EMULSION: Performed by: NURSE ANESTHETIST, CERTIFIED REGISTERED

## 2024-12-13 PROCEDURE — 25010000002 LIDOCAINE PF 2% 2 % SOLUTION: Performed by: NURSE ANESTHETIST, CERTIFIED REGISTERED

## 2024-12-13 PROCEDURE — 88305 TISSUE EXAM BY PATHOLOGIST: CPT | Performed by: INTERNAL MEDICINE

## 2024-12-13 PROCEDURE — 25810000003 SODIUM CHLORIDE 0.9 % SOLUTION: Performed by: ANESTHESIOLOGY

## 2024-12-13 PROCEDURE — 45385 COLONOSCOPY W/LESION REMOVAL: CPT | Performed by: INTERNAL MEDICINE

## 2024-12-13 RX ORDER — ONDANSETRON 2 MG/ML
4 INJECTION INTRAMUSCULAR; INTRAVENOUS ONCE AS NEEDED
Status: DISCONTINUED | OUTPATIENT
Start: 2024-12-13 | End: 2024-12-13 | Stop reason: HOSPADM

## 2024-12-13 RX ORDER — SODIUM CHLORIDE 0.9 % (FLUSH) 0.9 %
10 SYRINGE (ML) INJECTION AS NEEDED
Status: CANCELLED | OUTPATIENT
Start: 2024-12-13

## 2024-12-13 RX ORDER — SODIUM CHLORIDE 0.9 % (FLUSH) 0.9 %
10 SYRINGE (ML) INJECTION AS NEEDED
Status: DISCONTINUED | OUTPATIENT
Start: 2024-12-13 | End: 2024-12-13 | Stop reason: HOSPADM

## 2024-12-13 RX ORDER — LIDOCAINE HYDROCHLORIDE 10 MG/ML
0.5 INJECTION, SOLUTION EPIDURAL; INFILTRATION; INTRACAUDAL; PERINEURAL ONCE AS NEEDED
Status: CANCELLED | OUTPATIENT
Start: 2024-12-13

## 2024-12-13 RX ORDER — SODIUM CHLORIDE 9 MG/ML
40 INJECTION, SOLUTION INTRAVENOUS AS NEEDED
Status: CANCELLED | OUTPATIENT
Start: 2024-12-13

## 2024-12-13 RX ORDER — PROPOFOL 10 MG/ML
VIAL (ML) INTRAVENOUS AS NEEDED
Status: DISCONTINUED | OUTPATIENT
Start: 2024-12-13 | End: 2024-12-13 | Stop reason: SURG

## 2024-12-13 RX ORDER — SODIUM CHLORIDE 9 MG/ML
500 INJECTION, SOLUTION INTRAVENOUS CONTINUOUS PRN
Status: DISCONTINUED | OUTPATIENT
Start: 2024-12-13 | End: 2024-12-13 | Stop reason: HOSPADM

## 2024-12-13 RX ORDER — SODIUM CHLORIDE 0.9 % (FLUSH) 0.9 %
10 SYRINGE (ML) INJECTION EVERY 12 HOURS SCHEDULED
Status: CANCELLED | OUTPATIENT
Start: 2024-12-13

## 2024-12-13 RX ORDER — LIDOCAINE HYDROCHLORIDE 20 MG/ML
INJECTION, SOLUTION EPIDURAL; INFILTRATION; INTRACAUDAL; PERINEURAL AS NEEDED
Status: DISCONTINUED | OUTPATIENT
Start: 2024-12-13 | End: 2024-12-13 | Stop reason: SURG

## 2024-12-13 RX ADMIN — PROPOFOL 700 MG: 10 INJECTION, EMULSION INTRAVENOUS at 10:16

## 2024-12-13 RX ADMIN — SODIUM CHLORIDE 500 ML: 9 INJECTION, SOLUTION INTRAVENOUS at 10:54

## 2024-12-13 RX ADMIN — LIDOCAINE HYDROCHLORIDE 40 MG: 20 INJECTION, SOLUTION EPIDURAL; INFILTRATION; INTRACAUDAL; PERINEURAL at 10:16

## 2024-12-13 RX ADMIN — Medication 10 ML: at 08:51

## 2024-12-13 NOTE — ANESTHESIA PREPROCEDURE EVALUATION
Anesthesia Evaluation     Patient summary reviewed   no history of anesthetic complications:   NPO Solid Status: > 6 hours             Airway   Mallampati: II  Dental      Pulmonary    (-) sleep apnea  Cardiovascular     (+) hypertension  (-) past MI, cardiac stents, CABG      Neuro/Psych  (-) seizures, CVA  GI/Hepatic/Renal/Endo    (+) obesity    Musculoskeletal     Abdominal   (+) obese   Substance History      OB/GYN          Other   arthritis,   history of cancer                      Anesthesia Plan    ASA 2     MAC     intravenous induction     Anesthetic plan, risks, benefits, and alternatives have been provided, discussed and informed consent has been obtained with: patient.        CODE STATUS:

## 2024-12-13 NOTE — H&P
[facility] Gastroenterology  Pre Procedure History & Physical    Chief Complaint:   Screening    Subjective     HPI:   Screening    Past Medical History:   Past Medical History:   Diagnosis Date    ADD (attention deficit disorder)     Hyperlipidemia     Hypertension     Malignant neoplasm of right testis 10/18/2016    Melanoma     Osteoarthritis     Sleep disorder     Testicular mass        Past Surgical History:  Past Surgical History:   Procedure Laterality Date    COLONOSCOPY  10/30/2014    internal hemorrhoids    KNEE ARTHROSCOPY      MELANOMA WIDE LOCAL EXCISION      PORTACATH PLACEMENT      TESTICLE SURGERY      Granuloma of the right testicle    VASECTOMY         Family History:  Family History   Problem Relation Age of Onset    Colon polyps Mother     Cancer Mother     Diabetes Father     Hypertension Father     Colon polyps Father     Mental illness Paternal Uncle     Colon cancer Maternal Grandfather        Social History:   reports that he has quit smoking. He has never used smokeless tobacco. He reports current alcohol use. He reports that he does not use drugs.    Medications:   Prior to Admission medications    Medication Sig Start Date End Date Taking? Authorizing Provider   irbesartan (AVAPRO) 150 MG tablet Take 1 tablet by mouth Daily. 11/23/20  Yes Babak Pope MD   levocetirizine (XYZAL) 5 MG tablet Take 1 tablet by mouth Every Evening.   Yes Babak Pope MD   Inclisiran Sodium (LEQVIO SC) Inject  under the skin into the appropriate area as directed Every 6 (Six) Months.    Babak Pope MD   LIVALO 4 MG tablet Take 1 tablet by mouth Daily. 1/24/17   Baabk Pope MD   meloxicam (MOBIC) 15 MG tablet Take 1 tablet by mouth Daily.    Babak Pope MD   multivitamin with minerals tablet tablet Take 1 tablet by mouth Daily.    Babak Pope MD   vitamin B-12 (CYANOCOBALAMIN) 500 MCG tablet Take 1 tablet by mouth Daily.    Babak Pope MD  "  vitamin D (ERGOCALCIFEROL) 43038 units capsule capsule Take 50,000 Units by mouth Every 7 (Seven) Days.  Patient not taking: Reported on 10/31/2024 10/16/17   Provider, MD Babak   VITAMIN D PO Take  by mouth Daily.    Provider, Babak, MD       Allergies:  Sulfa antibiotics    ROS:    General: Weight stable  Resp: No SOA  Cardiovascular: No CP    Objective     Blood pressure 143/88, pulse 86, temperature 96.6 °F (35.9 °C), temperature source Temporal, resp. rate 20, height 172.7 cm (68\"), weight 99.8 kg (220 lb), SpO2 95%.    Physical Exam   Constitutional: Pt is oriented to person, place, and in no distress.   Cardiovascular: Normal rate, regular rhythm.    Pulmonary/Chest: Effort normal. No respiratory distress.   Abdominal: Non-distended.  Psychiatric: Mood, memory, affect and judgment appear normal.     Assessment & Plan     Diagnosis:  Screening    Anticipated Surgical Procedure:  Colonoscopy    The risks, benefits, and alternatives of this procedure have been discussed with the patient or the responsible party- the patient understands and agrees to proceed.    EMR Dragon/transcription disclaimer:  Much of this encounter note is electronic transcription/translation of spoken language to printed text.  The electronic translation of spoken language may be erroneous, or at times, nonsensical words or phrases may be inadvertently transcribed.  Although I have reviewed the note for such errors, some may still exist.  "

## 2024-12-13 NOTE — ANESTHESIA POSTPROCEDURE EVALUATION
Patient: Alex Vann    Procedure Summary       Date: 12/13/24 Room / Location: [facility] ENDOSCOPY 5 /  [facility]    Anesthesia Start: 1013 Anesthesia Stop: 1041    Procedure: COLONOSCOPY WITH ANESTHESIA Diagnosis:       Encounter for screening for malignant neoplasm of colon      (Encounter for screening for malignant neoplasm of colon [Z12.11])    Surgeons: Khari Mendez MD Provider: Wai Lemus CRNA    Anesthesia Type: MAC ASA Status: 2            Anesthesia Type: MAC    Vitals  Vitals Value Taken Time   BP 94/58 12/13/24 1111   Temp     Pulse 72 12/13/24 1112   Resp 20 12/13/24 1110   SpO2 91 % 12/13/24 1112   Vitals shown include unfiled device data.        Post Anesthesia Care and Evaluation    Patient location during evaluation: PHASE II  Level of consciousness: awake  Pain management: adequate    Airway patency: patent  Anesthetic complications: No anesthetic complications  PONV Status: none  Cardiovascular status: acceptable  Respiratory status: acceptable  Hydration status: acceptable

## 2024-12-16 LAB
CYTO UR: NORMAL
LAB AP CASE REPORT: NORMAL
Lab: NORMAL
PATH REPORT.FINAL DX SPEC: NORMAL
PATH REPORT.GROSS SPEC: NORMAL

## (undated) DEVICE — MASK,OXYGEN,MED CONC,ADLT,7' TUB, UC: Brand: PENDING

## (undated) DEVICE — THE CHANNEL CLEANING BRUSH IS A NYLON FLEXI BRUSH ATTACHED TO A FLEXIBLE PLASTIC SHEATH DESIGNED TO SAFELY REMOVE DEBRIS FROM FLEXIBLE ENDOSCOPES.

## (undated) DEVICE — SENSR O2 OXIMAX FNGR A/ 18IN NONSTR

## (undated) DEVICE — SUTURE MCRYL SZ 4-0 L18IN ABSRB UD L19MM PS-2 3/8 CIR PRIM Y496G

## (undated) DEVICE — THE SINGLE USE ETRAP – POLYP TRAP IS USED FOR SUCTION RETRIEVAL OF ENDOSCOPICALLY REMOVED POLYPS.: Brand: ETRAP

## (undated) DEVICE — YANKAUER,BULB TIP WITH VENT: Brand: ARGYLE

## (undated) DEVICE — BLADE SURG NO11 C STL RETRCT DISPOSABLE

## (undated) DEVICE — MINOR CDS: Brand: MEDLINE INDUSTRIES, INC.

## (undated) DEVICE — Device: Brand: SINGLE USE ELECTROSURGICAL SNARE SD-400

## (undated) DEVICE — SUTURE ABSORBABLE MONOFILAMENT 3-0 SH 27 IN UD PDS + PDP416H

## (undated) DEVICE — PACK,UNIVERSAL,NO GOWNS: Brand: MEDLINE

## (undated) DEVICE — GLOVE SURG SZ 7 L12IN FNGR THK94MIL TRNSLUC YEL LTX HYDRGEL

## (undated) DEVICE — Device: Brand: DEFENDO AIR/WATER/SUCTION AND BIOPSY VALVE

## (undated) DEVICE — CUFF,BP,DISP,1 TUBE,ADULT,HP: Brand: MEDLINE

## (undated) DEVICE — SOLUTION IV IRRIG POUR BRL 0.9% SODIUM CHL 2F7124

## (undated) DEVICE — ADHESIVE SKIN CLSR 0.7ML TOP DERMBND ADV

## (undated) DEVICE — TOWEL,OR,DSP,ST,BLUE,DLX,4/PK,20PK/CS: Brand: MEDLINE